# Patient Record
Sex: MALE | Race: BLACK OR AFRICAN AMERICAN | Employment: FULL TIME | URBAN - METROPOLITAN AREA
[De-identification: names, ages, dates, MRNs, and addresses within clinical notes are randomized per-mention and may not be internally consistent; named-entity substitution may affect disease eponyms.]

---

## 2017-05-05 ENCOUNTER — APPOINTMENT (OUTPATIENT)
Dept: CT IMAGING | Age: 22
End: 2017-05-05
Attending: PHYSICIAN ASSISTANT
Payer: COMMERCIAL

## 2017-05-05 ENCOUNTER — HOSPITAL ENCOUNTER (EMERGENCY)
Age: 22
Discharge: HOME | End: 2017-05-05
Attending: EMERGENCY MEDICINE
Payer: COMMERCIAL

## 2017-05-05 VITALS
HEART RATE: 61 BPM | OXYGEN SATURATION: 99 % | WEIGHT: 282.19 LBS | DIASTOLIC BLOOD PRESSURE: 66 MMHG | RESPIRATION RATE: 18 BRPM | HEIGHT: 74 IN | TEMPERATURE: 98 F | BODY MASS INDEX: 36.22 KG/M2 | SYSTOLIC BLOOD PRESSURE: 135 MMHG

## 2017-05-05 DIAGNOSIS — H57.11 EYE PAIN, RIGHT: Primary | ICD-10-CM

## 2017-05-05 PROCEDURE — 99283 EMERGENCY DEPT VISIT LOW MDM: CPT

## 2017-05-05 PROCEDURE — 74011250637 HC RX REV CODE- 250/637: Performed by: PHYSICIAN ASSISTANT

## 2017-05-05 PROCEDURE — 70450 CT HEAD/BRAIN W/O DYE: CPT

## 2017-05-05 PROCEDURE — 74011000250 HC RX REV CODE- 250: Performed by: PHYSICIAN ASSISTANT

## 2017-05-05 RX ORDER — TETRACAINE HYDROCHLORIDE 5 MG/ML
1 SOLUTION OPHTHALMIC
Status: COMPLETED | OUTPATIENT
Start: 2017-05-05 | End: 2017-05-05

## 2017-05-05 RX ORDER — HYDROCODONE BITARTRATE AND ACETAMINOPHEN 5; 325 MG/1; MG/1
1 TABLET ORAL
Qty: 20 TAB | Refills: 0 | Status: SHIPPED | OUTPATIENT
Start: 2017-05-05 | End: 2017-06-29 | Stop reason: CLARIF

## 2017-05-05 RX ORDER — IBUPROFEN 600 MG/1
600 TABLET ORAL
Qty: 20 TAB | Refills: 0 | Status: SHIPPED | OUTPATIENT
Start: 2017-05-05 | End: 2018-01-29

## 2017-05-05 RX ORDER — IBUPROFEN 600 MG/1
600 TABLET ORAL
Status: COMPLETED | OUTPATIENT
Start: 2017-05-05 | End: 2017-05-05

## 2017-05-05 RX ADMIN — FLUORESCEIN SODIUM 1 STRIP: 1 STRIP OPHTHALMIC at 09:40

## 2017-05-05 RX ADMIN — TETRACAINE HYDROCHLORIDE 1 DROP: 5 SOLUTION OPHTHALMIC at 09:40

## 2017-05-05 RX ADMIN — IBUPROFEN 600 MG: 600 TABLET, FILM COATED ORAL at 09:39

## 2017-05-05 NOTE — LETTER
[address] 
[facility] EMERGENCY DEPT 
[address] [place] [address] [phone] 
[phone] Work/School Note Date: 5/5/2017 To Whom It May concern: 
 
Zach Noe was seen and treated today in the emergency room by the following provider(s): 
Attending Provider: Angus Mancilla MD 
Physician Assistant: PRESLEY Aguilar. Zach Noe may return to physical or strenuous activity once cleared by a licensed physician. Sincerely, Sachin Valdez, [address]

## 2017-05-05 NOTE — ED PROVIDER NOTES
HPI Comments: Lizeth Black is a 19 y.o. male who presents ambulatory to the ED with cc of gradually worsening right eye pain x a \"few years. \" Pt reports associated HA and blurred vision in the morning. He notes that it feel as if his eye is going to \"pop\". Pt states that he notices his eye bulging and sinking. He reports that his pain is unchanged with position changes. Pt denies use of medication to modify his symptoms. He reports a hx of similar symptoms in the past and states that he has his eyes evaluated by ophthalmology and also had an MRI through Neurology with no significant findings. Pt denies any known trauma, cough, rhinorrhea, sore throat, or eye discharge. Social history significant for: - Tobacco, + EtOH, - Illicit drug use    PCP: Koko Smith MD    There are no other complaints, changes or physical findings at this time. Written by NATHAN Spenceibpro, as dictated by Giles Osgood, PA-C      The history is provided by the patient. No  was used. History reviewed. No pertinent past medical history. History reviewed. No pertinent surgical history. History reviewed. No pertinent family history. Social History     Social History    Marital status: SINGLE     Spouse name: N/A    Number of children: N/A    Years of education: N/A     Occupational History    Not on file. Social History Main Topics    Smoking status: Never Smoker    Smokeless tobacco: Not on file    Alcohol use Yes      Comment: occasional    Drug use: No    Sexual activity: Not on file     Other Topics Concern    Not on file     Social History Narrative         ALLERGIES: Tramadol    Review of Systems   Constitutional: Negative. Negative for chills and fever. HENT: Negative for rhinorrhea and sore throat. Eyes: Positive for pain (Right) and visual disturbance. Respiratory: Negative. Negative for cough, chest tightness, shortness of breath and wheezing. Cardiovascular: Negative. Negative for chest pain and palpitations. Gastrointestinal: Negative. Negative for abdominal pain, constipation, diarrhea, nausea and vomiting. Genitourinary: Negative. Negative for dysuria and hematuria. Musculoskeletal: Negative. Negative for arthralgias and myalgias. Skin: Negative. Negative for rash. Allergic/Immunologic: Negative. Negative for environmental allergies and food allergies. Neurological: Positive for headaches. Psychiatric/Behavioral: Negative. Negative for suicidal ideas. Patient Vitals for the past 12 hrs:   Temp Pulse Resp BP SpO2   05/05/17 0930 98 °F (36.7 °C) 61 18 135/66 99 %     Physical Exam   Constitutional: He is oriented to person, place, and time. He appears well-developed and well-nourished. No distress. Pt appears well, awake and alert in NAD. HENT:   Head: Normocephalic and atraumatic. Right Ear: Tympanic membrane, external ear and ear canal normal.   Left Ear: Tympanic membrane, external ear and ear canal normal.   Nose: Nose normal.   Mouth/Throat: Uvula is midline, oropharynx is clear and moist and mucous membranes are normal.   Eyes: Conjunctivae and EOM are normal. Pupils are equal, round, and reactive to light. Right eye exhibits no discharge. Left eye exhibits no discharge. Right conjunctiva is not injected. Right conjunctiva has no hemorrhage. Left conjunctiva is not injected. Left conjunctiva has no hemorrhage. IOP: 9 per Schioetz tonometer   Neck: Normal range of motion. Cardiovascular: Normal rate, normal heart sounds and intact distal pulses. Pulmonary/Chest: Effort normal and breath sounds normal. No respiratory distress. He has no wheezes. He has no rales. He exhibits no tenderness. Abdominal: Soft. Bowel sounds are normal. There is no tenderness. There is no guarding. No CVA tenderness b/l. Musculoskeletal: Normal range of motion. He exhibits no edema or tenderness.    Lymphadenopathy:     He has no cervical adenopathy. Neurological: He is alert and oriented to person, place, and time. No cranial nerve deficit. Coordination normal.   No focal neuro deficits. Skin: Skin is warm and dry. No rash noted. He is not diaphoretic. No erythema. No pallor. Psychiatric: He has a normal mood and affect. His behavior is normal.   Nursing note and vitals reviewed. MDM  Number of Diagnoses or Management Options  Eye pain, right:   Diagnosis management comments: DDx: Mass, Atypical Ha, Migraine HA, Corneal abrasion  Low suspicion of glaucoma as patient notes symptoms for over a year with prior ophthalmology evaluation. VS wnl, IOP wnl. Amount and/or Complexity of Data Reviewed  Tests in the radiology section of CPT®: ordered and reviewed  Review and summarize past medical records: yes  Discuss the patient with other providers: yes (Attending)    Patient Progress  Patient progress: stable    Procedures    Procedure Note - Wood's lamp exam:  9:39 AM  Performed by: Good Dumas PA-C  Pts Right eye was anesthetized with tetracaine, stained with fluorescein, and examined with a Wood's lamp, using lid eversion. Foreign body: no  Fluorescein uptake: no  The procedure took 1-15 minutes, and pt tolerated well. Written by Soledad Navarro ED Scribe, as dictated by Good Dumas PA-C. PROGRESS NOTE:  10:41 AM  Discussed pt case with Aurelia Cedillo MD. She recommends MRI of the pt's orbits. Written by Soledad Navarro ED scribe, as dictated by Good Dumas PA-C    PROGRESS NOTE:  10:45 AM  Pt denies MRI imaging today in the ED. Pt states that he has to pick someone up at 1200. He also states that he has  drill tomorrow. I advise pt to not participate in any strenuous or contact activity until his symptoms have been fully evaluated. Advised to contact PCP or neurologist to have MRI completed as soon as possible. Patient conveys understanding and agreement to all of the above.    Written by Soledad Navarro ED armond, as dictated by Freedom Barksdale PA-C    IMAGING RESULTS:  CT HEAD WO CONT   Final Result   INDICATION: R eye pain radiating into r side of scalp x 1 week      Exam: Noncontrast CT of the brain is performed with 5 mm collimation.     CT dose reduction was achieved with the use of the standardized protocol  tailored for this examination and automatic exposure control for dose  modulation.     Direct comparison is made to prior CT dated June 2009.     FINDINGS: There is no acute intracranial hemorrhage, mass, mass effect or  herniation. Ventricular system is normal. The gray-white matter differentiation  is well-preserved. The mastoid air cells are well pneumatized. The visualized  paranasal sinuses are normal. Within the inferior right orbit, there is linear  soft tissue with associated central calcification; while the central  calcification is unchanged, the soft tissue component has likely increased.     IMPRESSION  IMPRESSION: Inferior right orbit linear soft tissue which appears to have  increased compared to prior CT dated June 2009. Recommend clinical correlation  as well as dedicated MRI of the orbits for further evaluation. MEDICATIONS GIVEN:  Medications   ibuprofen (MOTRIN) tablet 600 mg (600 mg Oral Given 5/5/17 7802)   tetracaine HCl (PF) (PONTOCAINE) 0.5 % ophthalmic solution 1 Drop (1 Drop Right Eye Given by Provider 5/5/17 3561)   fluorescein (FUL-BEE) 1 mg ophthalmic strip 1 Strip (1 Strip Both Eyes Given by Provider 5/5/17 6189)       IMPRESSION:  1. Eye pain, right        PLAN:  1. Current Discharge Medication List      START taking these medications    Details   HYDROcodone-acetaminophen (NORCO) 5-325 mg per tablet Take 1 Tab by mouth every six (6) hours as needed for Pain. Max Daily Amount: 4 Tabs. Qty: 20 Tab, Refills: 0      ibuprofen (MOTRIN) 600 mg tablet Take 1 Tab by mouth every six (6) hours as needed for Pain. Qty: 20 Tab, Refills: 0           2.    Follow-up Information Follow up With Details Comments 0499 Tony Humphries MD Schedule an appointment as soon as possible for a visit in 3 days  [address] [facility] [address]      Elyse Paniagua MD Schedule an appointment as soon as possible for a visit in 4 days  [address]   MOB [address]  [place]  [phone]      [facility] EMERGENCY DEPT  As needed or, If symptoms worsen [address]  [address]  [phone]        Return to ED if worse     DISCHARGE NOTE  10:46 AM  The patient has been re-evaluated and is ready for discharge. Reviewed available results with patient. Counseled patient on diagnosis and care plan. Patient has expressed understanding, and all questions have been answered. Patient agrees with plan and agrees to follow up as recommended, or return to the ED if their symptoms worsen. Discharge instructions have been provided and explained to the patient, along with reasons to return to the ED. This note is prepared by Shane Soto, acting as Scribe for Faheem Carrillo PA-C. Faheem Carrillo PA-C: The scribe's documentation has been prepared under my direction and personally reviewed by me in its entirety. I confirm that the note above accurately reflects all work, treatment, procedures, and medical decision making performed by me. This note will not be viewable in [address]

## 2017-06-29 ENCOUNTER — TELEPHONE (OUTPATIENT)
Dept: NEUROLOGY | Age: 22
End: 2017-06-29

## 2017-06-29 ENCOUNTER — OFFICE VISIT (OUTPATIENT)
Dept: NEUROLOGY | Age: 22
End: 2017-06-29

## 2017-06-29 VITALS
RESPIRATION RATE: 12 BRPM | DIASTOLIC BLOOD PRESSURE: 62 MMHG | HEIGHT: 74 IN | WEIGHT: 276 LBS | SYSTOLIC BLOOD PRESSURE: 110 MMHG | BODY MASS INDEX: 35.42 KG/M2 | OXYGEN SATURATION: 98 % | HEART RATE: 60 BPM

## 2017-06-29 DIAGNOSIS — H05.89 ORBITAL MASS: ICD-10-CM

## 2017-06-29 DIAGNOSIS — G44.019 EPISODIC CLUSTER HEADACHE, NOT INTRACTABLE: Primary | ICD-10-CM

## 2017-06-29 DIAGNOSIS — R51.9: ICD-10-CM

## 2017-06-29 DIAGNOSIS — H53.9 TRANSIENT VISION DISTURBANCE OF RIGHT EYE: ICD-10-CM

## 2017-06-29 RX ORDER — BUTALBITAL, ACETAMINOPHEN AND CAFFEINE 50; 325; 40 MG/1; MG/1; MG/1
2 TABLET ORAL
Qty: 50 TAB | Refills: 11 | Status: SHIPPED | OUTPATIENT
Start: 2017-06-29 | End: 2018-01-29

## 2017-06-29 NOTE — PATIENT INSTRUCTIONS

## 2017-06-29 NOTE — PROGRESS NOTES
Consult  REFERRED BY:  Rupert Aldana MD    CHIEF COMPLAINT: Right eye pain    Subjective:     Ritika Stephens is a 23 y.o. right-handed -American male seen as a new patient today for evaluation of a new problem of progressive right eye pain and blurred vision on referral from Dr. Tamara Jones. Patient gives a 10 year history of slowly progressive pain in the right periorbital region that is described as severe, slightly worse at nighttime when he wakes up in the morning, but can occur any time. Associated with occasional blurred vision at times, but no loss of vision no double vision, no fever, and bending over seems to make the pain worse. His eye will sometimes become proptotic and bulging outward physically when he bends over or has severe pain. He just takes over-the-counter anti-inflammatories for pain because he is a  and cannot take stronger medication. He had a CT scan of the head done in 2009 and was read out as normal, and had a recent head CT scan that does show a right sided retrobulbar inferior and posterior orbital mass. Patient denies any fever, meningismus, head trauma in the past other than one fall when he was younger and sustained a scalp laceration that was stitched up but had no other neurologic problem afterwards. He did have one episode of syncope after he stood up quickly after tying his shoes have been bending over for long period of time when he was <13years of age. He denies any other focal weakness, sensory loss, or left eye pain. CT of the head ×2 in 2009 and 2017 have been normal otherwise. He has no other associated medical condition, no connective tissue disease, no unusual lymphadenopathy, no malaise, no meningismus, no fever. He has no family history of similar problems.    The pain has become more severe and sometimes he can even go to work because the pain is so intense, and it is progressively getting worse and more frequent and never seems to go away now.    Past Medical History:   Diagnosis Date    Migraine       History reviewed. No pertinent surgical history. Family History   Problem Relation Age of Onset    Migraines Mother     Migraines Maternal Aunt     Migraines Maternal Grandmother       Social History   Substance Use Topics    Smoking status: Never Smoker    Smokeless tobacco: Never Used    Alcohol use No      Comment: occasional         Current Outpatient Prescriptions:     butalbital-acetaminophen-caffeine (FIORICET, ESGIC) -40 mg per tablet, Take 2 Tabs by mouth every eight (8) hours as needed for Pain or Headache. Max Daily Amount: 6 Tabs. Indications: MIGRAINE, TENSION-TYPE HEADACHE, Disp: 50 Tab, Rfl: 11    ibuprofen (MOTRIN) 600 mg tablet, Take 1 Tab by mouth every six (6) hours as needed for Pain., Disp: 20 Tab, Rfl: 0        Allergies   Allergen Reactions    Tramadol Itching        Review of Systems:  A comprehensive review of systems was negative except for: Constitutional: positive for fatigue and malaise  Eyes: positive for visual disturbance  Neurological: positive for headaches and Right periorbital pain   Vitals:    06/29/17 0830   BP: 110/62   Pulse: 60   Resp: 12   SpO2: 98%   Weight: 276 lb (125.2 kg)   Height: 6' 2\" (1.88 m)     Objective:     I      NEUROLOGICAL EXAM:    Appearance: The patient is well developed, well nourished, provides a coherent history and is in mild acute distress because of right periorbital headache . Mental Status: Oriented to time, place and person, and the president, cognitive function is normal and speech is fluent and no aphasia or dysarthria. Mood and affect appropriate. Cranial Nerves:   Intact visual fields. Fundi are benign. Visual acuity is 20/20 in the right eye, and 20/25 in the left eye but he had previous sun damage to his left eye. YAJAIRA, EOM's full, no nystagmus, no ptosis. Facial sensation is normal. Corneal reflexes are not tested. Facial movement is symmetric.  Hearing is normal bilaterally. Palate is midline with normal sternocleidomastoid and trapezius muscles are normal. Tongue is midline. Neck without meningismus or bruits   Motor:  5/5 strength in upper and lower proximal and distal muscles. Normal bulk and tone. No fasciculations. Reflexes:   Deep tendon reflexes 2+/4 and symmetrical.  No babinski or clonus present   Sensory:   Normal to touch, pinprick and vibration. DSS is intact   Gait:  Normal gait. Tremor:   No tremor noted. Cerebellar:  No cerebellar signs present. Neurovascular:  Normal heart sounds and regular rhythm, peripheral pulses intact, and no carotid bruits. Assessment:       ICD-10-CM ICD-9-CM    1. Episodic cluster headache, not intractable G44.019 339.01 MRI BRAIN W WO CONT      JAROD COMPREHENSIVE PLUS PANEL      ANGIOTENSIN CONVERTING ENZYME      CBC WITH AUTOMATED DIFF      SED RATE (ESR)      VITAMIN D, 25 HYROXY PANEL      VITAMIN B12 & FOLATE      IMMUNOELECTROPHORESIS (IMMUNOFIX.)      ANTINEURONAL CELL AB      butalbital-acetaminophen-caffeine (FIORICET, ESGIC) -40 mg per tablet      ANCA PANEL      TSH 3RD GENERATION      T4      T3 TOTAL      RPR      RA + CCP ABS   2. Headache, retro-ocular, rightr R51 784.0 MRI BRAIN W WO CONT      JAROD COMPREHENSIVE PLUS PANEL      ANGIOTENSIN CONVERTING ENZYME      CBC WITH AUTOMATED DIFF      SED RATE (ESR)      VITAMIN D, 25 HYROXY PANEL      VITAMIN B12 & FOLATE      IMMUNOELECTROPHORESIS (IMMUNOFIX.)      ANTINEURONAL CELL AB      butalbital-acetaminophen-caffeine (FIORICET, ESGIC) -40 mg per tablet      ANCA PANEL      TSH 3RD GENERATION      T4      T3 TOTAL      RPR      RA + CCP ABS   3.  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 Transient vision disturbance of right eye H53.9 368.9 MRI BRAIN W WO CONT      JAROD COMPREHENSIVE PLUS PANEL      ANGIOTENSIN CONVERTING ENZYME      CBC WITH AUTOMATED DIFF      SED RATE (ESR)      VITAMIN D, 25 HYROXY PANEL      VITAMIN B12 & FOLATE      IMMUNOELECTROPHORESIS (IMMUNOFIX.)      ANTINEURONAL CELL AB      butalbital-acetaminophen-caffeine (FIORICET, ESGIC) -40 mg per tablet      ANCA PANEL      TSH 3RD GENERATION      T4      T3 TOTAL      RPR      RA + CCP ABS         Plan:     Patient with a right orbital mass posteriorly and inferiorly, possibilities could include pseudotumor cerebri, lymphoma, tumor, or other inflammatory or neoplastic mass lesion  We will get an MRI of the brain and orbits with and without contrast as soon as possible  He will get a CBC and differential and sed rate, JAROD,ANCA panel, rheumatoid titer, serology, ACE level, thyroid test, and will be referred to neuro-ophthalmology and they have been called and we are waiting their call back. He may need biopsy or empiric treatment with steroids. Patient otherwise has a completely normal exam  Further treatment evaluation depending the results of his clinical course, we gave him Fioricet to use as needed for the headache in the interim but not when he is working. He will call for results, and his CT scan was reviewed personally on the PACS system today, that is both of them, and I agree with the interpretation that there was minimal tumor present 2009 and is clearly grown since that time and needs to be evaluated. I reviewed all his records and lab studies on the chart too. One hour spent with the patient going over his films, Ng diagnosis, going over his prognosis, and further therapy options.     Signed By: Balta Howell MD     June 29, 2017       CC: France Dickinson MD  FAX: [phone]    This note will not be viewable in [address]

## 2017-06-29 NOTE — LETTER
6/29/2017 2:26 PM 
 
RE:    Lorrane Hidden Coppin  
[address] [facility] [address] [id number] Thank you for agreeing to see Jeanne August I am referring my patient to you. Please see his 
pertinent patient information below. Progress Notes Encounter Date: 6/29/2017 Jamila Lang MD  
Neurology Hide copied text Hover for attribution information Consult REFERRED BY: 
Renetta Albarado MD 
  
CHIEF COMPLAINT: Right eye pain 
  
Subjective:  
  
Scarlet Phelps is a 23 y.o. right-handed -American male seen as a new patient today for evaluation of a new problem of progressive right eye pain and blurred vision on referral from Dr. Merline Bernstein. Patient gives a 10 year history of slowly progressive pain in the right periorbital region that is described as severe, slightly worse at nighttime when he wakes up in the morning, but can occur any time. Associated with occasional blurred vision at times, but no loss of vision no double vision, no fever, and bending over seems to make the pain worse. His eye will sometimes become proptotic and bulging outward physically when he bends over or has severe pain. He just takes over-the-counter anti-inflammatories for pain because he is a  and cannot take stronger medication. He had a CT scan of the head done in 2009 and was read out as normal, and had a recent head CT scan that does show a right sided retrobulbar inferior and posterior orbital mass. Patient denies any fever, meningismus, head trauma in the past other than one fall when he was younger and sustained a scalp laceration that was stitched up but had no other neurologic problem afterwards. He did have one episode of syncope after he stood up quickly after tying his shoes have been bending over for long period of time when he was <13years of age. He denies any other focal weakness, sensory loss, or left eye pain.   CT of the head ×2 in 2009 and 2017 have been normal otherwise. He has no other associated medical condition, no connective tissue disease, no unusual lymphadenopathy, no malaise, no meningismus, no fever. He has no family history of similar problems. The pain has become more severe and sometimes he can even go to work because the pain is so intense, and it is progressively getting worse and more frequent and never seems to go away now. 
  
    
Past Medical History:  
Diagnosis Date  Migraine    
  
History reviewed. No pertinent surgical history. Family History Problem Relation Age of Onset  Migraines Mother    
 Migraines Maternal Aunt    
 Migraines Maternal Grandmother    
  
      
Social History Substance Use Topics  Smoking status: Never Smoker  Smokeless tobacco: Never Used  Alcohol use No  
      Comment: occasional  
   
  
Current Outpatient Prescriptions:  
  butalbital-acetaminophen-caffeine (FIORICET, ESGIC) -40 mg per tablet, Take 2 Tabs by mouth every eight (8) hours as needed for Pain or Headache. Max Daily Amount: 6 Tabs. Indications: MIGRAINE, TENSION-TYPE HEADACHE, Disp: 50 Tab, Rfl: 11 
  ibuprofen (MOTRIN) 600 mg tablet, Take 1 Tab by mouth every six (6) hours as needed for Pain., Disp: 20 Tab, Rfl: 0 
  
  
  
    
Allergies Allergen Reactions  Tramadol Itching  
  
  
Review of Systems: A comprehensive review of systems was negative except for: Constitutional: positive for fatigue and malaise Eyes: positive for visual disturbance Neurological: positive for headaches and Right periorbital pain Vitals:  
  06/29/17 0830 BP: 110/62 Pulse: 60 Resp: 12 SpO2: 98% Weight: 276 lb (125.2 kg) Height: 6' 2\" (1.88 m)  
  
Objective:  
  
I 
  
  
NEUROLOGICAL EXAM: 
  
Appearance: The patient is well developed, well nourished, provides a coherent history and is in mild acute distress because of right periorbital headache . Mental Status: Oriented to time, place and person, and the president, cognitive function is normal and speech is fluent and no aphasia or dysarthria. Mood and affect appropriate. Cranial Nerves:   Intact visual fields. Fundi are benign. Visual acuity is 20/20 in the right eye, and 20/25 in the left eye but he had previous sun damage to his left eye. YAJAIRA, EOM's full, no nystagmus, no ptosis. Facial sensation is normal. Corneal reflexes are not tested. Facial movement is symmetric. Hearing is normal bilaterally. Palate is midline with normal sternocleidomastoid and trapezius muscles are normal. Tongue is midline. Neck without meningismus or bruits Motor:  5/5 strength in upper and lower proximal and distal muscles. Normal bulk and tone. No fasciculations. Reflexes:   Deep tendon reflexes 2+/4 and symmetrical. 
No babinski or clonus present Sensory:   Normal to touch, pinprick and vibration. DSS is intact Gait:  Normal gait. Tremor:   No tremor noted. Cerebellar:  No cerebellar signs present. Neurovascular:  Normal heart sounds and regular rhythm, peripheral pulses intact, and no carotid bruits.  
  
  
  
  
Assessment:  
  
    ICD-10-CM ICD-9-CM    
1. Episodic cluster headache, not intractable G44.019 339.01 MRI BRAIN W WO CONT  
      JAROD COMPREHENSIVE PLUS PANEL  
      ANGIOTENSIN CONVERTING ENZYME  
      CBC WITH AUTOMATED DIFF  
      SED RATE (ESR)  
      VITAMIN D, 25 HYROXY PANEL  
      VITAMIN B12 & FOLATE  
      IMMUNOELECTROPHORESIS (IMMUNOFIX.)       ANTINEURONAL CELL AB  
      butalbital-acetaminophen-caffeine (FIORICET, ESGIC) -40 mg per tablet  
      ANCA PANEL  
      TSH 3RD GENERATION  
      T4  
      T3 TOTAL  
      RPR  
      RA + CCP ABS 2. Headache, retro-ocular, rightr R51 784.0 MRI BRAIN W WO CONT  
      JAROD COMPREHENSIVE PLUS PANEL  
      ANGIOTENSIN CONVERTING ENZYME  
      CBC WITH AUTOMATED DIFF  
      SED RATE (ESR)       VITAMIN D, 25 HYROXY PANEL  
      VITAMIN B12 & FOLATE  
      IMMUNOELECTROPHORESIS (IMMUNOFIX.)       ANTINEURONAL CELL AB  
      butalbital-acetaminophen-caffeine (FIORICET, ESGIC) -40 mg per tablet  
      ANCA PANEL  
      TSH 3RD GENERATION  
      T4  
      T3 TOTAL  
      RPR  
      RA + CCP ABS 3. Orbital mass, right H05.89 376.89 MRI BRAIN W WO CONT  
      JAROD COMPREHENSIVE PLUS PANEL  
      ANGIOTENSIN CONVERTING ENZYME  
      CBC WITH AUTOMATED DIFF  
      SED RATE (ESR)  
      VITAMIN D, 25 HYROXY PANEL  
      VITAMIN B12 & FOLATE  
      IMMUNOELECTROPHORESIS (IMMUNOFIX.)       ANTINEURONAL CELL AB  
      butalbital-acetaminophen-caffeine (FIORICET, ESGIC) -40 mg per tablet  
      ANCA PANEL  
      TSH 3RD GENERATION  
      T4  
      T3 TOTAL  
      RPR  
      RA + CCP ABS 4. Transient vision disturbance of right eye H53.9 368.9 MRI BRAIN W WO CONT  
      JAROD COMPREHENSIVE PLUS PANEL  
      ANGIOTENSIN CONVERTING ENZYME  
      CBC WITH AUTOMATED DIFF  
      SED RATE (ESR)  
      VITAMIN D, 25 HYROXY PANEL  
      VITAMIN B12 & FOLATE  
      IMMUNOELECTROPHORESIS (IMMUNOFIX.)       ANTINEURONAL CELL AB  
      butalbital-acetaminophen-caffeine (FIORICET, ESGIC) -40 mg per tablet  
      ANCA PANEL  
      TSH 3RD GENERATION  
      T4  
      T3 TOTAL  
      RPR  
      RA + CCP ABS  
  
  
  
Plan:  
  
Patient with a right orbital mass posteriorly and inferiorly, possibilities could include pseudotumor cerebri, lymphoma, tumor, or other inflammatory or neoplastic mass lesion We will get an MRI of the brain and orbits with and without contrast as soon as possible He will get a CBC and differential and sed rate, JAROD,ANCA panel, rheumatoid titer, serology, ACE level, thyroid test, and will be referred to neuro-ophthalmology and they have been called and we are waiting their call back. He may need biopsy or empiric treatment with steroids. Patient otherwise has a completely normal exam 
Further treatment evaluation depending the results of his clinical course, we gave him Fioricet to use as needed for the headache in the interim but not when he is working. He will call for results, and his CT scan was reviewed personally on the PACS system today, that is both of them, and I agree with the interpretation that there was minimal tumor present 2009 and is clearly grown since that time and needs to be evaluated. I reviewed all his records and lab studies on the chart too. One hour spent with the patient going over his films, Ng diagnosis, going over his prognosis, and further therapy options. 
  
Signed By: Yoandy Garcia MD   
  June 29, 2017   
    
  
 
 
 
 
I appreciate your assistance in Mr. Isaac's care  and look forward to your findings and recommendations. Sincerely, Yoandy Garcia MD

## 2017-06-29 NOTE — LETTER
6/29/2017 10:05 AM 
 
Patient:  Kirsten Troncoso YOB: 1995 Date of Visit: 6/29/2017 Dear No Recipients: Thank you for referring Mr. Kiana Freedman to me for evaluation/treatment. Below are the relevant portions of my assessment and plan of care. Consult REFERRED BY: 
Patrick Rangel MD 
 
CHIEF COMPLAINT: Right eye pain Subjective:  
 
Kirsten Troncoso is a 23 y.o. right-handed -American male seen as a new patient today for evaluation of a new problem of progressive right eye pain and blurred vision on referral from Dr. Misael Lynn. Patient gives a 10 year history of slowly progressive pain in the right periorbital region that is described as severe, slightly worse at nighttime when he wakes up in the morning, but can occur any time. Associated with occasional blurred vision at times, but no loss of vision no double vision, no fever, and bending over seems to make the pain worse. His eye will sometimes become proptotic and bulging outward physically when he bends over or has severe pain. He just takes over-the-counter anti-inflammatories for pain because he is a  and cannot take stronger medication. He had a CT scan of the head done in 2009 and was read out as normal, and had a recent head CT scan that does show a right sided retrobulbar inferior and posterior orbital mass. Patient denies any fever, meningismus, head trauma in the past other than one fall when he was younger and sustained a scalp laceration that was stitched up but had no other neurologic problem afterwards. He did have one episode of syncope after he stood up quickly after tying his shoes have been bending over for long period of time when he was <13years of age. He denies any other focal weakness, sensory loss, or left eye pain. CT of the head ×2 in 2009 and 2017 have been normal otherwise.   He has no other associated medical condition, no connective tissue disease, no unusual lymphadenopathy, no malaise, no meningismus, no fever. He has no family history of similar problems. The pain has become more severe and sometimes he can even go to work because the pain is so intense, and it is progressively getting worse and more frequent and never seems to go away now. Past Medical History:  
Diagnosis Date  Migraine History reviewed. No pertinent surgical history. Family History Problem Relation Age of Onset  Migraines Mother  Migraines Maternal Aunt  Migraines Maternal Grandmother Social History Substance Use Topics  Smoking status: Never Smoker  Smokeless tobacco: Never Used  Alcohol use No  
   Comment: occasional  
   
 
Current Outpatient Prescriptions:  
  butalbital-acetaminophen-caffeine (FIORICET, ESGIC) -40 mg per tablet, Take 2 Tabs by mouth every eight (8) hours as needed for Pain or Headache. Max Daily Amount: 6 Tabs. Indications: MIGRAINE, TENSION-TYPE HEADACHE, Disp: 50 Tab, Rfl: 11 
  ibuprofen (MOTRIN) 600 mg tablet, Take 1 Tab by mouth every six (6) hours as needed for Pain., Disp: 20 Tab, Rfl: 0 Allergies Allergen Reactions  Tramadol Itching Review of Systems: A comprehensive review of systems was negative except for: Constitutional: positive for fatigue and malaise Eyes: positive for visual disturbance Neurological: positive for headaches and Right periorbital pain Vitals:  
 06/29/17 0830 BP: 110/62 Pulse: 60 Resp: 12 SpO2: 98% Weight: 276 lb (125.2 kg) Height: 6' 2\" (1.88 m) Objective: I 
 
 
NEUROLOGICAL EXAM: 
 
Appearance: The patient is well developed, well nourished, provides a coherent history and is in mild acute distress because of right periorbital headache . Mental Status: Oriented to time, place and person, and the president, cognitive function is normal and speech is fluent and no aphasia or dysarthria. Mood and affect appropriate. Cranial Nerves:   Intact visual fields. Fundi are benign. Visual acuity is 20/20 in the right eye, and 20/25 in the left eye but he had previous sun damage to his left eye. YAJAIRA, EOM's full, no nystagmus, no ptosis. Facial sensation is normal. Corneal reflexes are not tested. Facial movement is symmetric. Hearing is normal bilaterally. Palate is midline with normal sternocleidomastoid and trapezius muscles are normal. Tongue is midline. Neck without meningismus or bruits Motor:  5/5 strength in upper and lower proximal and distal muscles. Normal bulk and tone. No fasciculations. Reflexes:   Deep tendon reflexes 2+/4 and symmetrical. 
No babinski or clonus present Sensory:   Normal to touch, pinprick and vibration. DSS is intact Gait:  Normal gait. Tremor:   No tremor noted. Cerebellar:  No cerebellar signs present. Neurovascular:  Normal heart sounds and regular rhythm, peripheral pulses intact, and no carotid bruits. Assessment: ICD-10-CM ICD-9-CM 1. Episodic cluster headache, not intractable G44.019 339.01 MRI BRAIN W WO CONT  
   JAROD COMPREHENSIVE PLUS PANEL  
   ANGIOTENSIN CONVERTING ENZYME  
   CBC WITH AUTOMATED DIFF  
   SED RATE (ESR) VITAMIN D, 25 HYROXY PANEL  
   VITAMIN B12 & FOLATE IMMUNOELECTROPHORESIS (IMMUNOFIX.) ANTINEURONAL CELL AB  
   butalbital-acetaminophen-caffeine (FIORICET, ESGIC) -40 mg per tablet ANCA PANEL  
   TSH 3RD GENERATION  
   T4  
   T3 TOTAL  
   RPR  
   RA + CCP ABS 2. Headache, retro-ocular, rightr R51 784.0 MRI BRAIN W WO CONT  
   JAROD COMPREHENSIVE PLUS PANEL  
   ANGIOTENSIN CONVERTING ENZYME  
   CBC WITH AUTOMATED DIFF  
   SED RATE (ESR) VITAMIN D, 25 HYROXY PANEL  
   VITAMIN B12 & FOLATE IMMUNOELECTROPHORESIS (IMMUNOFIX.) ANTINEURONAL CELL AB  
   butalbital-acetaminophen-caffeine (FIORICET, ESGIC) -40 mg per tablet    ANCA PANEL  
 TSH 3RD GENERATION  
   T4  
   T3 TOTAL  
   RPR  
   RA + CCP ABS 3. Orbital mass, right H05.89 376.89 MRI BRAIN W WO CONT  
   JAROD COMPREHENSIVE PLUS PANEL  
   ANGIOTENSIN CONVERTING ENZYME  
   CBC WITH AUTOMATED DIFF  
   SED RATE (ESR) VITAMIN D, 25 HYROXY PANEL  
   VITAMIN B12 & FOLATE IMMUNOELECTROPHORESIS (IMMUNOFIX.) ANTINEURONAL CELL AB  
   butalbital-acetaminophen-caffeine (FIORICET, ESGIC) -40 mg per tablet ANCA PANEL  
   TSH 3RD GENERATION  
   T4  
   T3 TOTAL  
   RPR  
   RA + CCP ABS 4. Transient vision disturbance of right eye H53.9 368.9 MRI BRAIN W WO CONT  
   JAROD COMPREHENSIVE PLUS PANEL  
   ANGIOTENSIN CONVERTING ENZYME  
   CBC WITH AUTOMATED DIFF  
   SED RATE (ESR) VITAMIN D, 25 HYROXY PANEL  
   VITAMIN B12 & FOLATE IMMUNOELECTROPHORESIS (IMMUNOFIX.) ANTINEURONAL CELL AB  
   butalbital-acetaminophen-caffeine (FIORICET, ESGIC) -40 mg per tablet ANCA PANEL  
   TSH 3RD GENERATION  
   T4  
   T3 TOTAL  
   RPR  
   RA + CCP ABS Plan:  
 
Patient with a right orbital mass posteriorly and inferiorly, possibilities could include pseudotumor cerebri, lymphoma, tumor, or other inflammatory or neoplastic mass lesion We will get an MRI of the brain and orbits with and without contrast as soon as possible He will get a CBC and differential and sed rate, JAROD,ANCA panel, rheumatoid titer, serology, ACE level, thyroid test, and will be referred to neuro-ophthalmology and they have been called and we are waiting their call back. He may need biopsy or empiric treatment with steroids. Patient otherwise has a completely normal exam 
Further treatment evaluation depending the results of his clinical course, we gave him Fioricet to use as needed for the headache in the interim but not when he is working.  
He will call for results, and his CT scan was reviewed personally on the PACS system today, that is both of them, and I agree with the interpretation that there was minimal tumor present 2009 and is clearly grown since that time and needs to be evaluated. I reviewed all his records and lab studies on the chart too. One hour spent with the patient going over his films, Ng diagnosis, going over his prognosis, and further therapy options. Signed By: Estrella Macias MD   
 June 29, 2017 CC: Saurabh Villegas MD 
FAX: [phone] This note will not be viewable in [address] If you have questions, please do not hesitate to call me. I look forward to following Mr. Isaac along with you. Sincerely, Estrella Macias MD

## 2017-06-29 NOTE — MR AVS SNAPSHOT
Visit Information Date & Time Provider Department Dept. Phone Encounter #  
 6/29/2017  8:00 AM Brandy Burden MD [facility]rology [facility] at [facility] [phone] [id number] Follow-up Instructions Return in about 3 months (around 9/29/2017). Upcoming Health Maintenance Date Due INFLUENZA AGE 9 TO ADULT 8/1/2017 DTaP/Tdap/Td series (3 - Td) 11/24/2026 Allergies as of 6/29/2017  Review Complete On: 6/29/2017 By: Brandy Burden MD  
  
 Severity Noted Reaction Type Reactions Tramadol  12/10/2015    Itching Current Immunizations  Never Reviewed Name Date TDAP Vaccine 8/13/2012  2:22 PM  
 Tdap 11/24/2016  4:46 PM  
  
 Not reviewed this visit You Were Diagnosed With   
  
 Codes Comments Episodic cluster headache, not intractable    -  Primary ICD-10-CM: G44.019 
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  
 110/62 60 12 6' 2\" (1.88 m) 276 lb (125.2 kg) 98% BMI Smoking Status 35.44 kg/m2 Never Smoker Vitals History BMI and BSA Data Body Mass Index Body Surface Area  
 35.44 kg/m 2 2.56 m 2 Preferred Pharmacy Pharmacy Name Phone [facility]  [place] [phone] Your Updated Medication List  
  
   
This list is accurate as of: 6/29/17  9:14 AM.  Always use your most recent med list.  
  
  
  
  
 butalbital-acetaminophen-caffeine -40 mg per tablet Commonly known as:  Nohemi Gunn Take 2 Tabs by mouth every eight (8) hours as needed for Pain or Headache. Max Daily Amount: 6 Tabs. Indications: MIGRAINE, TENSION-TYPE HEADACHE ibuprofen 600 mg tablet Commonly known as:  MOTRIN Take 1 Tab by mouth every six (6) hours as needed for Pain. Prescriptions Printed Refills  
 butalbital-acetaminophen-caffeine (FIORICET, ESGIC) -40 mg per tablet 11 Sig: Take 2 Tabs by mouth every eight (8) hours as needed for Pain or Headache. Max Daily Amount: 6 Tabs. Indications: MIGRAINE, TENSION-TYPE HEADACHE Class: Print Route: Oral  
  
We Performed the Following JAROD COMPREHENSIVE PLUS PANEL [[id number] Custom] ANCA PANEL [id number] CPT(R)] ANGIOTENSIN CONVERTING ENZYME [id number] CPT(R)] ANTINEURONAL CELL AB [id number] CPT(R)] CBC WITH AUTOMATED DIFF [15895 CPT(R)] IMMUNOELECTROPHORESIS [facility].) [id number] CPT(R)]   
 RA + CCP ABS [[id number] Custom] RPR [91323 CPT(R)] SED RATE (ESR) [id number] CPT(R)]   
 T3 TOTAL [27686 CPT(R)] T4 [id number] CPT(R)] TSH 3RD GENERATION [61222 CPT(R)] VITAMIN B12 & FOLATE [20311 CPT(R)] VITAMIN D, 25 HYROXY PANEL [[id number] Custom] Follow-up Instructions Return in about 3 months (around 9/29/2017). To-Do List   
 07/06/2017 Imaging:  MRI BRAIN W WO CONT Patient Instructions A Healthy Lifestyle: Care Instructions Your Care Instructions A healthy lifestyle can help you feel good, stay at a healthy weight, and have plenty of energy for both work and play. A healthy lifestyle is something you can share with your whole family. A healthy lifestyle also can lower your risk for serious health problems, such as high blood pressure, heart disease, and diabetes. You can follow a few steps listed below to improve your health and the health of your family. Follow-up care is a key part of your treatment and safety. Be sure to make and go to all appointments, and call your doctor if you are having problems. Its also a good idea to know your test results and keep a list of the medicines you take. How can you care for yourself at home? · Do not eat too much sugar, fat, or fast foods. You can still have dessert and treats now and then. The goal is moderation. · Start small to improve your eating habits. Pay attention to portion sizes, drink less juice and soda pop, and eat more fruits and vegetables. ¨ Eat a healthy amount of food. A 3-ounce serving of meat, for example, is about the size of a deck of cards. Fill the rest of your plate with vegetables and whole grains. ¨ Limit the amount of soda and sports drinks you have every day. Drink more water when you are thirsty. ¨ Eat at least 5 servings of fruits and vegetables every day. It may seem like a lot, but it is not hard to reach this goal. A serving or helping is 1 piece of fruit, 1 cup of vegetables, or 2 cups of leafy, raw vegetables. Have an apple or some carrot sticks as an afternoon snack instead of a candy bar. Try to have fruits and/or vegetables at every meal. 
· Make exercise part of your daily routine. You may want to start with simple activities, such as walking, bicycling, or slow swimming. Try to be active 30 to 60 minutes every day. You do not need to do all 30 to 60 minutes all at once. For example, you can exercise 3 times a day for 10 or 20 minutes. Moderate exercise is safe for most people, but it is always a good idea to talk to your doctor before starting an exercise program. 
· Keep moving. Marizol Gums the lawn, work in the garden, or pfwaterworks. Take the stairs instead of the elevator at work. · If you smoke, quit. People who smoke have an increased risk for heart attack, stroke, cancer, and other lung illnesses. Quitting is hard, but there are ways to boost your chance of quitting tobacco for good. ¨ Use nicotine gum, patches, or lozenges. ¨ Ask your doctor about stop-smoking programs and medicines. ¨ Keep trying.  
In addition to reducing your risk of diseases in the future, you will notice some benefits soon after you stop using tobacco. If you have shortness of breath or asthma symptoms, they will likely get better within a few weeks after you quit. · Limit how much alcohol you drink. Moderate amounts of alcohol (up to 2 drinks a day for men, 1 drink a day for women) are okay. But drinking too much can lead to liver problems, high blood pressure, and other health problems. Family health If you have a family, there are many things you can do together to improve your health. · Eat meals together as a family as often as possible. · Eat healthy foods. This includes fruits, vegetables, lean meats and dairy, and whole grains. · Include your family in your fitness plan. Most people think of activities such as jogging or tennis as the way to fitness, but there are many ways you and your family can be more active. Anything that makes you breathe hard and gets your heart pumping is exercise. Here are some tips: 
¨ Walk to do errands or to take your child to school or the bus. ¨ Go for a family bike ride after dinner instead of watching TV. Where can you learn more? Go to http://londonMemoir Systemseloisa.info/. Enter T786 in the search box to learn more about \"A Healthy Lifestyle: Care Instructions. \" Current as of: July 26, 2016 Content Version: 11.3 © 3811-5281 [employer]. Care instructions adapted under license by [employer] (which disclaims liability or warranty for this information). If you have questions about a medical condition or this instruction, always ask your healthcare professional. [place] [address] any warranty or liability for your use of this information. Introducing hospitals & HEALTH SERVICES! [facility] introduces [email or web address] patient portal. Now you can access parts of your medical record, email your doctor's office, and request medication refills online.    
 
1. In your internet browser, go to [email or web address] 2. Click on the First Time User? Click Here link in the Sign In box. You will see the New Member Sign Up page. 3. Enter your DramaFever Access Code exactly as it appears below. You will not need to use this code after youve completed the sign-up process. If you do not sign up before the expiration date, you must request a new code. · DramaFever Access Code: [email or web address] Expires: 8/3/2017  9:33 AM 
 
4. Enter the last four digits of your Social Security Number (xxxx) and Date of Birth (mm/dd/yyyy) as indicated and click Submit. You will be taken to the next sign-up page. 5. Create a Before the Callt ID. This will be your DramaFever login ID and cannot be changed, so think of one that is secure and easy to remember. 6. Create a DramaFever password. You can change your password at any time. 7. Enter your Password Reset Question and Answer. This can be used at a later time if you forget your password. 8. Enter your e-mail address. You will receive e-mail notification when new information is available in [address] 9. Click Sign Up. You can now view and download portions of your medical record. 10. Click the Download Summary menu link to download a portable copy of your medical information. If you have questions, please visit the Frequently Asked Questions section of the DramaFever website. Remember, DramaFever is NOT to be used for urgent needs. For medical emergencies, dial 911. Now available from your iPhone and Android! Please provide this summary of care documentation to your next provider. Your primary care clinician is listed as Cindy Enamorado. If you have any questions after today's visit, please call [phone].

## 2018-01-29 ENCOUNTER — HOSPITAL ENCOUNTER (EMERGENCY)
Age: 23
Discharge: HOME | End: 2018-01-29
Attending: EMERGENCY MEDICINE
Payer: SELF-PAY

## 2018-01-29 VITALS
DIASTOLIC BLOOD PRESSURE: 69 MMHG | OXYGEN SATURATION: 100 % | TEMPERATURE: 98.7 F | WEIGHT: 270.95 LBS | HEIGHT: 74 IN | BODY MASS INDEX: 34.77 KG/M2 | SYSTOLIC BLOOD PRESSURE: 134 MMHG | RESPIRATION RATE: 14 BRPM

## 2018-01-29 DIAGNOSIS — F32.1 MODERATE SINGLE CURRENT EPISODE OF MAJOR DEPRESSIVE DISORDER (HCC): Primary | ICD-10-CM

## 2018-01-29 PROCEDURE — 90791 PSYCH DIAGNOSTIC EVALUATION: CPT

## 2018-01-29 PROCEDURE — 99284 EMERGENCY DEPT VISIT MOD MDM: CPT

## 2018-01-29 PROCEDURE — 99283 EMERGENCY DEPT VISIT LOW MDM: CPT

## 2018-01-29 RX ORDER — HYDROXYZINE 50 MG/1
50 TABLET, FILM COATED ORAL
Qty: 20 TAB | Refills: 0 | Status: SHIPPED | OUTPATIENT
Start: 2018-01-29 | End: 2018-02-08

## 2018-01-29 RX ORDER — ACETAMINOPHEN, DIPHENHYDRAMINE HCL, PHENYLEPHRINE HCL 325; 25; 5 MG/1; MG/1; MG/1
10 TABLET ORAL
Qty: 30 TAB | Refills: 0 | Status: SHIPPED | OUTPATIENT
Start: 2018-01-29

## 2018-01-29 NOTE — BSMART NOTE
Comprehensive Assessment Form Part 1      Section I - Disposition    Axis I - PTSD   Axis II - Deferred, Borderline Traits  Axis III -   Past Medical History:   Diagnosis Date    Migraine    Axis IV - financial, relationship, employment  [place] V - 52      The Medical Doctor to Psychiatrist conference was not completed. The Medical Doctor is in agreement with Psychiatrist disposition because of (reason) patient is not seeking admission and does not meet criteria for a TDO and ER provider is in agreement with discharge with follow up at [facility]. The plan is ER provider to write for atarax or similar medication and melatonin. Patient to follow up with [facility] outpatient office or [facility]. The on-call Psychiatrist consulted was Dr. Axel Bass. The admitting Psychiatrist will be Dr. Axel Bass. The admitting Diagnosis is NA. The Payor source is [employer]    Section II - Integrated Summary  Summary:  Patient arrives to ER reporting depression with past suicidal ideation without a plan. He denies homicidal ideation and hallucinations. Patient reports that he has decreased appetite and sleep but denies wanting to be admitted reporting financial issues and needing to work tomorrow. Patient reports struggling with his relationship due to her past infidelity. He reports PTSD from his time in the [employer] and \"a bad training experience\". He denies homicidal ideation but reports thoughts of wanting to hurt people when they make him mad like when he gets cut off driving but he reports it is just a fleeting thought and he does not actually want to hurt anyone. He does reports a violence history but when asked about this he reports he will break things but is not violent with people and it is a coping skill he developed. Patient reports he was seeing a counselor but with financial issues and her trying to ease him into 2 times a week. He reports losing his job and he is now working but it does not pay well.  He reports being at the ER today wanting medications and referrals only. The patienthas demonstrated mental capacity to provide informed consent. The information is given by the patient. The Chief Complaint is depression. The Precipitant Factors are psychosocial.  Previous Hospitalizations: no  The patient has not previously been in restraints. Current Psychiatrist and/or  is none. Lethality Assessment:    The potential for suicide noted by the following: ideation . The potential for homicide is not noted. The patient has not been a perpetrator of sexual or physical abuse. There are not pending charges. The patient is not felt to be at risk for self harm or harm to others. The attending nurse was advised that security has not been notified. Section III - Psychosocial  The patient's overall mood and attitude is calm and cooperative but depressed. Feelings of helplessness and hopelessness are observed by verbal report. Generalized anxiety is not observed. Panic is not observed. Phobias are not observed. Obsessive compulsive tendencies are not observed. Section IV - Mental Status Exam  The patient's appearance shows no evidence of impairment. The patient's behavior shows no evidence of impairment. The patient is oriented to time, place, person and situation. The patient's speech shows no evidence of impairment. The patient's mood is depressed, is sad and displays anhedonia. The range of affect is flat. The patient's thought content demonstrates no evidence of impairment. The thought process shows no evidence of impairment. The patient's perception shows no evidence of impairment. The patient's memory shows no evidence of impairment. The patient's appetite is decreased and shows signs of weight loss. The patient's sleep has evidence of insomnia. The patient's insight shows no evidence of impairment. The patient's judgement shows no evidence of impairment.         Section V - Substance Abuse  The patient is using substances. The patient is using cannabis by inhalation for 1-5 years with last use on this week. The patient has experienced the following withdrawal symptoms: N/A. Section VI - Living Arrangements  The patient has a significant other. The patient lives with a significant other. The patient has no children. The patient does plan to return home upon discharge. The patient does not have legal issues pending. The patient's source of income comes from employment. Latter day and cultural practices have not been voiced at this time. The patient's greatest support comes from \"no one\" and this person will not be involved with the treatment. The patient has been in an event described as horrible or outside the realm of ordinary life experience either currently or in the past.  The patient has not been a victim of sexual/physical abuse. Section VII - Other Areas of Clinical Concern  The highest grade achieved is 12 with the overall quality of school experience being described as not assessed. The patient is currently employed and speaks [place] as a primary language. The patient has no communication impairments affecting communication. The patient's preference for learning can be described as: can read and write adequately.   The patient's hearing is normal.  The patient's vision is normal.      Christiano Mckee [facility]

## 2018-01-29 NOTE — DISCHARGE INSTRUCTIONS
[facility],  [address] Providers    Accepts Insured Patients Only:  [facility]  [address]       [phone]   Near the corner of [facility] and [address] in the near [address] [place] [address]. Accepts most insurance including Medicaid/Medicare. No psychiatry. On the [facility] bus line. [phone]  [address] [facility] ([address]  [address] [place]. [address] [facility] [employer])     [phone]   Accepts most major insurances. Psychiatry available. Some DBT groups. [facility] [address] [facility])    [phone]   Mixture of psychologists and psychiatrists. They do not accept Medicaid or Medicare. [facility]  [address] [place] [address]       [phone]   Mixture of clinical social workers and psychologists. Sliding Scale/Financial Aid/Differing Payment Options  [facility]  [address] [facility]) [phone]   Our own Trinjustin Latch and Evonne Carrasco. Variety of treatment options, including DBT. [address]       [phone]   Provides a variety of group and individual counseling options. Insurance, Medicaid, Medicare and sliding scale      Medicaid/Medicare providers in the [address] area  [address] [place] [address]. [address]       [phone]    [facility]         [address]       [phone]    [place]  [address] [place] [address], [facility]    [phone] ex.  [address] [facility]     [phone]    [facility]      [phone]      Services for patients without Medicaid, [place] or Insurance  The  [address]       [phone]   See handout in separate folder    [address]       Depression and Chronic Disease: Care Instructions  Your Care Instructions    A chronic disease is one that you have for a long time. Some chronic diseases can be controlled, but they usually cannot be cured. Depression is common in people with chronic diseases, but it often goes unnoticed. Many people have concerns about seeking treatment for a mental health problem. You may think it's a sign of weakness, or you don't want people to know about it. It's important to overcome these reasons for not seeking treatment. Treating depression or anxiety is good for your health. Follow-up care is a key part of your treatment and safety. Be sure to make and go to all appointments, and call your doctor if you are having problems. It's also a good idea to know your test results and keep a list of the medicines you take. How can you care for yourself at home? Watch for symptoms of depression  The symptoms of depression are often subtle at first. You may think they are caused by your disease rather than depression. Or you may think it is normal to be depressed when you have a chronic disease. If you are depressed you may:  · Feel sad or hopeless. · Feel guilty or worthless. · Not enjoy the things you used to enjoy. · Feel hopeless, as though life is not worth living. · Have trouble thinking or remembering. · Have low energy, and you may not eat or sleep well. · Pull away from others. · Think often about death or killing yourself. (Keep the numbers for these national suicide hotlines: 7-047-991-TALK [1-154.861.7801] and 2-583-FPKSVDT [1-761.608.4671]. )  Get treatment  By treating your depression, you can feel more hopeful and have more energy. If you feel better, you may take better care of yourself, so your health may improve. · Talk to your doctor if you have any changes in mood during treatment for your disease. · Ask your doctor for help. Counseling, antidepressant medicine, or a combination of the two can help most people with depression.  Often a combination works best. Counseling can also help you cope with having a chronic disease. When should you call for help? Call 911 anytime you think you may need emergency care. For example, call if:  ? · You feel like hurting yourself or someone else. ? · Someone you know has depression and is about to attempt or is attempting suicide. ?Call your doctor now or seek immediate medical care if:  ? · You hear voices. ? · Someone you know has depression and:  ¨ Starts to give away his or her possessions. ¨ Uses illegal drugs or drinks alcohol heavily. ¨ Talks or writes about death, including writing suicide notes or talking about guns, knives, or pills. ¨ Starts to spend a lot of time alone. ¨ Acts very aggressively or suddenly appears calm. ? Watch closely for changes in your health, and be sure to contact your doctor if:  ? · You do not get better as expected. Where can you learn more? Go to http://london-eloisa.info/. Enter F698 in the search box to learn more about \"Depression and Chronic Disease: Care Instructions. \"  Current as of: May 12, 2017  Content Version: 11.4  © 3899-1891 [employer]. Care instructions adapted under license by [employer] (which disclaims liability or warranty for this information). If you have questions about a medical condition or this instruction, always ask your healthcare professional. [address] any warranty or liability for your use of this information.

## 2018-01-29 NOTE — ED PROVIDER NOTES
EMERGENCY DEPARTMENT HISTORY AND PHYSICAL EXAM      Date: 1/29/2018  Patient Name: Kaleigh Peres    History of Presenting Illness     Chief Complaint   Patient presents with    Mental Health Problem     x 2 years battling depression Pt states recent stressors that have caused increase in symptoms Pt reports SI with plan but not acted on symptoms       History Provided By: Patient    HPI: Kaleigh Peres, 23 y.o. male with PMHx significant for migraines and depression, presents ambulatory to the ED with cc of progressively worsening depression which has worsened since November of 2017. Pt notes he has previously been seen by a counselor for his depression, but he stopped seeing her in November because she became unreliable. He is not currently on depression medication. Pt explains he is feeling like \"everyday it is something new\" and \"why me\". He reports that he sometimes feels like driving into traffic as he is driving down the road. Pt also c/o associated agitation, anxiety, sleep disturbance, and decreased appetite. Pt also reports \"seeing a figure when he wakes up\". He notes his sleep is sporadic. Pt has had previous similar symptoms in the [facility]. He denies SI and HI. Pt reports no associated pain. PCP: Carissa Schmitz MD    There are no other complaints, changes, or physical findings at this time. Past History     Past Medical History:  Past Medical History:   Diagnosis Date    Migraine        Past Surgical History:  History reviewed. No pertinent surgical history. Family History:  Family History   Problem Relation Age of Onset   [address] Migraines Mother     Migraines Maternal Aunt     Migraines Maternal Grandmother        Social History:  Social History   Substance Use Topics    Smoking status: Never Smoker    Smokeless tobacco: Never Used    Alcohol use No      Comment: occasional       Allergies:   Allergies   Allergen Reactions    Tramadol Itching         Review of Systems Review of Systems   Constitutional: Positive for appetite change. Negative for chills, diaphoresis and fever. HENT: Negative. Negative for congestion, ear pain, sore throat and trouble swallowing. Eyes: Negative. Negative for photophobia, pain, redness and visual disturbance. Respiratory: Negative. Negative for cough, chest tightness, shortness of breath and wheezing. Cardiovascular: Negative. Negative for chest pain and palpitations. Gastrointestinal: Negative. Negative for abdominal pain, blood in stool, diarrhea, nausea and vomiting. Genitourinary: Negative for dysuria and frequency. Musculoskeletal: Negative. Negative for back pain, joint swelling and neck pain. Skin: Negative. Neurological: Negative. Negative for seizures, syncope and headaches. Psychiatric/Behavioral: Positive for agitation and sleep disturbance. Negative for behavioral problems and confusion. The patient is nervous/anxious. + depression   All other systems reviewed and are negative. Physical Exam   Physical Exam   Constitutional: He is oriented to person, place, and time. He appears well-developed and well-nourished. HENT:   Head: Normocephalic and atraumatic. Eyes: Conjunctivae and EOM are normal.   Neck: Normal range of motion. Neck supple. Cardiovascular: Normal rate and regular rhythm. Pulmonary/Chest: Effort normal and breath sounds normal. No respiratory distress. Abdominal: Soft. He exhibits no distension. There is no tenderness. Musculoskeletal: Normal range of motion. Neurological: He is alert and oriented to person, place, and time. Skin: Skin is warm and dry. Psychiatric:   Flat affect   Nursing note and vitals reviewed. Medical Decision Making   I am the first provider for this patient. I reviewed the vital signs, available nursing notes, past medical history, past surgical history, family history and social history.     Vital Signs-Reviewed the patient's vital signs. Patient Vitals for the past 12 hrs:   Temp Resp BP SpO2   01/29/18 1257 98.7 °F (37.1 °C) 14 134/69 100 %       Pulse Oximetry Analysis - 100% on RA    Records Reviewed: Nursing Notes and Old Medical Records    Provider Notes (Medical Decision Making):   Patient presents with depression and no suicidal ideation. DDx:  2/2 MDD, schizoaffective d/o, bipolar, drug induced, organic cause such as electrolyte anomoly or infection. Will speak with mental health professional about possible admission. Comfortable with prescribing Hydroxyzine for his anxiety. He will need to follow up with behavioral health services for further evaluation of his depression. ED Course:   Initial assessment performed. The patients presenting problems have been discussed, and they are in agreement with the care plan formulated and outlined with them. I have encouraged them to ask questions as they arise throughout their visit. 2:40 PM  [facility] in department. 3:06 PM  Patient has been evaluated by [facility]. They state he is fine to follow up and does not need to be admitted. He is asking for something to help with his anxiety and difficulty sleeping. Disposition:  DISCHARGE NOTE  3:19 PM  The patient has been re-evaluated and is ready for discharge. Reviewed available results with patient. Counseled patient on diagnosis and care plan. Patient has expressed understanding, and all questions have been answered. Patient agrees with plan and agrees to follow up as recommended, or return to the ED if their symptoms worsen. Discharge instructions have been provided and explained to the patient, along with reasons to return to the ED. PLAN:  1. Current Discharge Medication List      START taking these medications    Details   hydrOXYzine HCl (ATARAX) 50 mg tablet Take 1 Tab by mouth every six (6) hours as needed for Anxiety for up to 10 days. Qty: 20 Tab, Refills: 0      melatonin 10 mg tab Take 10 mg by mouth nightly.   Qty: 30 Tab, Refills: 0           2. Follow-up Information     Follow up With Details Comments [id number] Tony Humphries MD   [facility]  [phone]          Return to ED if worse     Diagnosis     Clinical Impression:   1. Moderate single current episode of major depressive disorder ([facility] [address]        Attestations:    Attestation Note:  This note is prepared by [facility], acting as Scribe for MD James Rosales MD: The scribe's documentation has been prepared under my direction and personally reviewed by me in its entirety. I confirm that the note above accurately reflects all work, treatment, procedures, and medical decision making performed by me.

## 2018-01-29 NOTE — ED NOTES
Dr. Jasmin Vaca has reviewed discharge instructions with the patient. The patient verbalized understanding. Pt ambulatory home with discharge papers in hand.

## 2018-01-29 NOTE — ED NOTES
Pt arrived ambulatory from triage to room 39 with cc depression. Per pt he has hx of depression with SI and self harm. Today pt states he has been feeling down lately and having fleeting thoughts of SI though denies plan at this time. Pt states he would like to talk with someone about being prescribed something to help with his thoughts and his sleep. Per pt he has had a difficult time falling asleep and staying asleep. Pt in no acute distress. VSS.

## 2018-07-25 ENCOUNTER — HOSPITAL ENCOUNTER (EMERGENCY)
Age: 23
Discharge: HOME | End: 2018-07-25
Attending: EMERGENCY MEDICINE
Payer: COMMERCIAL

## 2018-07-25 VITALS
BODY MASS INDEX: 34.4 KG/M2 | DIASTOLIC BLOOD PRESSURE: 77 MMHG | SYSTOLIC BLOOD PRESSURE: 156 MMHG | WEIGHT: 253.97 LBS | RESPIRATION RATE: 18 BRPM | HEART RATE: 66 BPM | TEMPERATURE: 98.9 F | HEIGHT: 72 IN

## 2018-07-25 DIAGNOSIS — N18.9 CHRONIC KIDNEY DISEASE, UNSPECIFIED CKD STAGE: ICD-10-CM

## 2018-07-25 DIAGNOSIS — F32.89 OTHER DEPRESSION: Primary | ICD-10-CM

## 2018-07-25 LAB
ALBUMIN SERPL-MCNC: 4.6 G/DL (ref 3.5–5)
ALBUMIN/GLOB SERPL: 1.3 {RATIO} (ref 1.1–2.2)
ALP SERPL-CCNC: 66 U/L (ref 45–117)
ALT SERPL-CCNC: 23 U/L (ref 12–78)
AMPHET UR QL SCN: NEGATIVE
ANION GAP SERPL CALC-SCNC: 6 MMOL/L (ref 5–15)
APAP SERPL-MCNC: <2 UG/ML (ref 10–30)
APPEARANCE UR: CLEAR
AST SERPL-CCNC: 27 U/L (ref 15–37)
BACTERIA URNS QL MICRO: NEGATIVE /HPF
BARBITURATES UR QL SCN: NEGATIVE
BASOPHILS # BLD: 0 K/UL (ref 0–0.1)
BASOPHILS NFR BLD: 0 % (ref 0–1)
BENZODIAZ UR QL: NEGATIVE
BILIRUB SERPL-MCNC: 1.5 MG/DL (ref 0.2–1)
BILIRUB UR QL: NEGATIVE
BUN SERPL-MCNC: 14 MG/DL (ref 6–20)
BUN/CREAT SERPL: 9 (ref 12–20)
CALCIUM SERPL-MCNC: 9.6 MG/DL (ref 8.5–10.1)
CANNABINOIDS UR QL SCN: POSITIVE
CHLORIDE SERPL-SCNC: 106 MMOL/L (ref 97–108)
CO2 SERPL-SCNC: 26 MMOL/L (ref 21–32)
COCAINE UR QL SCN: NEGATIVE
COLOR UR: ABNORMAL
CREAT SERPL-MCNC: 1.61 MG/DL (ref 0.7–1.3)
DIFFERENTIAL METHOD BLD: NORMAL
DRUG SCRN COMMENT,DRGCM: ABNORMAL
EOSINOPHIL # BLD: 0 K/UL (ref 0–0.4)
EOSINOPHIL NFR BLD: 1 % (ref 0–7)
EPITH CASTS URNS QL MICRO: ABNORMAL /LPF
ERYTHROCYTE [DISTWIDTH] IN BLOOD BY AUTOMATED COUNT: 11.9 % (ref 11.5–14.5)
ETHANOL SERPL-MCNC: <10 MG/DL
GLOBULIN SER CALC-MCNC: 3.6 G/DL (ref 2–4)
GLUCOSE SERPL-MCNC: 79 MG/DL (ref 65–100)
GLUCOSE UR STRIP.AUTO-MCNC: NEGATIVE MG/DL
HCT VFR BLD AUTO: 44.8 % (ref 36.6–50.3)
HGB BLD-MCNC: 14.9 G/DL (ref 12.1–17)
HGB UR QL STRIP: NEGATIVE
IMM GRANULOCYTES # BLD: 0 K/UL (ref 0–0.04)
IMM GRANULOCYTES NFR BLD AUTO: 0 % (ref 0–0.5)
KETONES UR QL STRIP.AUTO: 40 MG/DL
LEUKOCYTE ESTERASE UR QL STRIP.AUTO: NEGATIVE
LYMPHOCYTES # BLD: 1.6 K/UL (ref 0.8–3.5)
LYMPHOCYTES NFR BLD: 23 % (ref 12–49)
MCH RBC QN AUTO: 30.2 PG (ref 26–34)
MCHC RBC AUTO-ENTMCNC: 33.3 G/DL (ref 30–36.5)
MCV RBC AUTO: 90.9 FL (ref 80–99)
METHADONE UR QL: NEGATIVE
MONOCYTES # BLD: 0.5 K/UL (ref 0–1)
MONOCYTES NFR BLD: 7 % (ref 5–13)
MUCOUS THREADS URNS QL MICRO: ABNORMAL /LPF
NEUTS SEG # BLD: 4.9 K/UL (ref 1.8–8)
NEUTS SEG NFR BLD: 69 % (ref 32–75)
NITRITE UR QL STRIP.AUTO: NEGATIVE
NRBC # BLD: 0 K/UL (ref 0–0.01)
NRBC BLD-RTO: 0 PER 100 WBC
OPIATES UR QL: NEGATIVE
PCP UR QL: NEGATIVE
PH UR STRIP: 6.5 [PH] (ref 5–8)
PLATELET # BLD AUTO: 203 K/UL (ref 150–400)
PMV BLD AUTO: 10.6 FL (ref 8.9–12.9)
POTASSIUM SERPL-SCNC: 3.8 MMOL/L (ref 3.5–5.1)
PROT SERPL-MCNC: 8.2 G/DL (ref 6.4–8.2)
PROT UR STRIP-MCNC: 30 MG/DL
RBC # BLD AUTO: 4.93 M/UL (ref 4.1–5.7)
RBC #/AREA URNS HPF: ABNORMAL /HPF (ref 0–5)
SALICYLATES SERPL-MCNC: <1.7 MG/DL (ref 2.8–20)
SODIUM SERPL-SCNC: 138 MMOL/L (ref 136–145)
SP GR UR REFRACTOMETRY: 1.01 (ref 1–1.03)
UA: UC IF INDICATED,UAUC: ABNORMAL
UROBILINOGEN UR QL STRIP.AUTO: 0.2 EU/DL (ref 0.2–1)
WBC # BLD AUTO: 7.1 K/UL (ref 4.1–11.1)
WBC URNS QL MICRO: ABNORMAL /HPF (ref 0–4)

## 2018-07-25 PROCEDURE — 81001 URINALYSIS AUTO W/SCOPE: CPT | Performed by: PHYSICIAN ASSISTANT

## 2018-07-25 PROCEDURE — 80053 COMPREHEN METABOLIC PANEL: CPT | Performed by: PHYSICIAN ASSISTANT

## 2018-07-25 PROCEDURE — 90791 PSYCH DIAGNOSTIC EVALUATION: CPT

## 2018-07-25 PROCEDURE — 80307 DRUG TEST PRSMV CHEM ANLYZR: CPT | Performed by: PHYSICIAN ASSISTANT

## 2018-07-25 PROCEDURE — 99283 EMERGENCY DEPT VISIT LOW MDM: CPT

## 2018-07-25 PROCEDURE — 36415 COLL VENOUS BLD VENIPUNCTURE: CPT | Performed by: PHYSICIAN ASSISTANT

## 2018-07-25 PROCEDURE — 85025 COMPLETE CBC W/AUTO DIFF WBC: CPT | Performed by: PHYSICIAN ASSISTANT

## 2018-07-25 NOTE — BSMART NOTE
Comprehensive Assessment Form Part 1    Section I - Disposition    Axis I - PTSD (per patient)   Axis II - Deferred  Axis III - Migraine  Axis IV - Relationship stressors  [address] - [address]      The Medical Doctor to Psychiatrist conference was not completed. The Medical Doctor is in agreement with Psychiatrist disposition because of (reason) patient is not requesting admission and does not meet admission criteria. The plan is discharge with outpatient therapy resources. The on-call Psychiatrist consulted was Dr. Selvin Montano. The admitting Psychiatrist will be Dr. Selvin Montano. The admitting Diagnosis is NA. The Payor source is [employer]. Section II - Integrated Summary  Summary:  Patient is a 20year old seen face to face in the ER. Patient came to the ER very distressed after assisting a friend with changing her bandages after she was discharged from the hospital after a serious suicide attempt. He reported he got overwhelmed and started thinking about his own history of cutting and feel helpless. His friend who cut is his ex-girlfriend and she has over 40 stitches in her arms from significant, vertical cutting. He stated once sitting back in the ER and calming down he knows he wants to be strong for his friend. He denied wanting to harm himself or kill himself. He also denied homicidal ideation or symptoms of psychosis. The last time he cut was in February 2016. He saw a counselor last year, but stopped going due to finances. He is now doing better financially and wants to return to counseling. He requested resources. The patient has demonstrated mental capacity to provide informed consent. The information is given by the patient and past medical records. The Chief Complaint is mental health problem. The Precipitant Factors are saw friend's suicide attempt cuts. Previous Hospitalizations: none  The patient has not previously been in restraints. Current Psychiatrist and/or  is NA.     Lethality Assessment:    The potential for suicide is not noted. The potential for homicide is not noted. The patient has not been a perpetrator of sexual or physical abuse. There are not pending charges. The patient is not felt to be at risk for self harm or harm to others. The attending nurse was advised that security has not been notified. Section III - Psychosocial  The patient's overall mood and attitude is euthymic. Feelings of helplessness and hopelessness are not observed. Generalized anxiety is not observed. Panic is not observed. Phobias are not observed. Obsessive compulsive tendencies are not observed. Section IV - Mental Status Exam  The patient's appearance shows no evidence of impairment. The patient's behavior shows no evidence of impairment. The patient is oriented to time, place, person and situation. The patient's speech shows no evidence of impairment. The patient's mood is euthymic. The range of affect shows no evidence of impairment. The patient's thought content demonstrates no evidence of impairment. The thought process shows no evidence of impairment. The patient's perception shows no evidence of impairment. The patient's memory shows no evidence of impairment. The patient's appetite shows no evidence of impairment. The patient's sleep shows no evidence of impairment. The patient's insight shows no evidence of impairment. The patient's judgement shows no evidence of impairment. Section V - Substance Abuse  The patient is using substances. The patient is using cannabis by inhalation for 1-5 years with last use on unknown. The patient has experienced the following withdrawal symptoms: N/A. Section VI - Living Arrangements  The patient is single. The patient lives with his parents. The patient has no children. The patient does plan to return home upon discharge. The patient does not have legal issues pending.  The patient's source of income comes from employment. Jain and cultural practices have not been voiced at this time. The patient's greatest support comes from his mother and this person will not be involved with the treatment. The patient has been in an event described as horrible or outside the realm of ordinary life experience either currently or in the past.  The patient has not been a victim of sexual/physical abuse. Section VII - Other Areas of Clinical Concern  The highest grade achieved is unknown with the overall quality of school experience being described as unknown. The patient is currently employed and speaks [place] as a primary language. The patient has no communication impairments affecting communication. The patient's preference for learning can be described as: can read and write adequately.   The patient's hearing is normal.  The patient's vision is normal.      Bella Mckenna

## 2018-07-25 NOTE — ED NOTES
Patient discharge instructions reviewed with patient by PA. Patient ambulatory off unit.  Patient left prior to obtaining repeat VS.

## 2018-07-25 NOTE — DISCHARGE INSTRUCTIONS
Blood Urea Nitrogen (BUN) Test: About This Test  What is it? A blood urea nitrogen (BUN) test measures the amount of nitrogen in your blood that comes from the waste product urea. Urea is made in the liver and passed out of your body in the urine. If your kidneys are not able to remove urea from the blood normally, your BUN level rises. Dehydration can also make your BUN level higher. A BUN test may be done with a blood creatinine test. The level of creatinine in your blood also tells how well your kidneys are working. A high creatinine level may mean your kidneys are not working properly. BUN and creatinine tests can be used together to find the BUN-to-creatinine ratio. Why is this test done? A BUN test is done to:  · See if your kidneys are working normally. · See if your kidney disease is getting worse. · See if treatment of your kidney disease is working. · Check for severe dehydration. Dehydration generally causes BUN levels to rise more than creatinine levels. This causes a high BUN-to-creatinine ratio. How can you prepare for the test?  · Do not eat a lot of meat or other protein in the 24 hours before having a BUN test.  What happens during the test?  · A health professional takes a sample of your blood. What else should you know about the test?  · Your results will include an explanation of what a \"normal\" result is. This is called a \"reference range. \" It is just a guide. Your doctor will evaluate your results based on your health and other factors. This means that a value that falls outside the normal values listed may still be normal for you. · Make sure your doctor knows all of the medicines, vitamins, herbal products, and supplements you take. What happens after the test?  · You will probably be able to go home right away. · You can go back to your usual activities right away. Follow-up care is a key part of your treatment and safety.  Be sure to make and go to all appointments, and call your doctor if you are having problems. It's also a good idea to keep a list of the medicines you take. Ask your doctor when you can expect to have your test results. Where can you learn more? Go to http://london-eloisa.info/. Enter B964 in the search box to learn more about \"Blood Urea Nitrogen (BUN) Test: About This Test.\"  Current as of: May 12, 2017  Content Version: 11.7  © 0210-1493 [employer]. Care instructions adapted under license by [employer] (which disclaims liability or warranty for this information). If you have questions about a medical condition or this instruction, always ask your healthcare professional. [address] any warranty or liability for your use of this information. Kidney Disease and High Blood Pressure: Care Instructions  Your Care Instructions    Long-term (chronic) kidney disease happens when the kidneys cannot remove waste and keep your body's fluids and chemicals in balance. Usually, the kidneys remove waste from the blood through the urine. When the kidneys are not working well, waste can build up so much that it poisons the body. Kidney disease can make you very tired. It also can cause swelling, or edema, in your legs or other areas of your body. High blood pressure is one of the major causes of chronic kidney disease. And kidney disease can also cause high blood pressure. No matter which came first, having high blood pressure damages the tiny blood vessels in the kidneys. If you have high blood pressure, it is important to lower it. There are many things you can do to lower your blood pressure, which may help slow or stop the damage to your kidneys. Follow-up care is a key part of your treatment and safety. Be sure to make and go to all appointments, and call your doctor if you are having problems. It's also a good idea to know your test results and keep a list of the medicines you take.   How can you care for yourself at home? · Be safe with medicines. Take your medicines exactly as prescribed. Call your doctor if you have any problems with your medicine. You will probably need more than one medicine to lower your blood pressure. You will get more details on the specific medicines your doctor prescribes. · Work with your doctor and a dietitian to plan meals that have the right amount of nutrients for you. You will probably have to limit salt, fluids, and protein. · Stay at a healthy weight. This is very important if you put on weight around the waist. Losing even 10 pounds can help you lower your blood pressure. · Manage other health problems such as diabetes and high cholesterol. You can help lower your risk for heart disease and blood vessel problems with a healthy lifestyle along with medicines. · Do not take ibuprofen (Advil, Motrin) or naproxen (Aleve), or similar medicines, unless your doctor tells you to. They may make chronic kidney disease worse. It is okay to take acetaminophen (Tylenol). · If your doctor recommends it, get more exercise. Walking is a good choice. Bit by bit, increase the amount you walk every day. Try for at least 30 minutes on most days of the week. You also may want to swim, bike, or do other activities. · Limit or avoid alcohol. Talk to your doctor about whether you can drink any alcohol. · Do not smoke or allow others to smoke around you. If you need help quitting, talk to your doctor about stop-smoking programs and medicines. These can increase your chances of quitting for good. When should you call for help? Call 911 anytime you think you may need emergency care.  For example, call if:    · You passed out (lost consciousness).    Call your doctor now or seek immediate medical care if:    · You have new or worse nausea and vomiting.     · You have much less urine than normal, or you have no urine.     · You are feeling confused or cannot think clearly.     · You have new or more blood in your urine.     · You have new swelling.     · You are dizzy or lightheaded, or you feel like you may faint.    Watch closely for changes in your health, and be sure to contact your doctor if:    · You do not get better as expected. Where can you learn more? Go to http://london-eloisa.info/. Enter O166 in the search box to learn more about \"Kidney Disease and High Blood Pressure: Care Instructions. \"  Current as of: May 12, 2017  Content Version: 11.7  © 5958-2638 [employer]. Care instructions adapted under license by [employer] (which disclaims liability or warranty for this information). If you have questions about a medical condition or this instruction, always ask your healthcare professional. [address] any warranty or liability for your use of this information. Low Sodium Diet (2,000 Milligram): Care Instructions  Your Care Instructions    Too much sodium causes your body to hold on to extra water. This can raise your blood pressure and force your heart and kidneys to work harder. In very serious cases, this could cause you to be put in the hospital. It might even be life-threatening. By limiting sodium, you will feel better and lower your risk of serious problems. The most common source of sodium is salt. People get most of the salt in their diet from canned, prepared, and packaged foods. Fast food and restaurant meals also are very high in sodium. Your doctor will probably limit your sodium to less than 2,000 milligrams (mg) a day. This limit counts all the sodium in prepared and packaged foods and any salt you add to your food. Follow-up care is a key part of your treatment and safety. Be sure to make and go to all appointments, and call your doctor if you are having problems. It's also a good idea to know your test results and keep a list of the medicines you take.   How can you care for yourself at home?  Read food labels  · Read labels on cans and food packages. The labels tell you how much sodium is in each serving. Make sure that you look at the serving size. If you eat more than the serving size, you have eaten more sodium. · Food labels also tell you the Percent Daily Value for sodium. Choose products with low Percent Daily Values for sodium. · Be aware that sodium can come in forms other than salt, including monosodium glutamate (MSG), sodium citrate, and sodium bicarbonate (baking soda). MSG is often added to Asian food. When you eat out, you can sometimes ask for food without MSG or added salt. Buy low-sodium foods  · Buy foods that are labeled \"unsalted\" (no salt added), \"sodium-free\" (less than 5 mg of sodium per serving), or \"low-sodium\" (less than 140 mg of sodium per serving). Foods labeled \"reduced-sodium\" and \"light sodium\" may still have too much sodium. Be sure to read the label to see how much sodium you are getting. · Buy fresh vegetables, or frozen vegetables without added sauces. Buy low-sodium versions of canned vegetables, soups, and other canned goods. Prepare low-sodium meals  · Cut back on the amount of salt you use in cooking. This will help you adjust to the taste. Do not add salt after cooking. One teaspoon of salt has about 2,300 mg of sodium. · Take the salt shaker off the table. · Flavor your food with garlic, lemon juice, onion, vinegar, herbs, and spices. Do not use soy sauce, lite soy sauce, steak sauce, onion salt, garlic salt, celery salt, mustard, or ketchup on your food. · Use low-sodium salad dressings, sauces, and ketchup. Or make your own salad dressings and sauces without adding salt. · Use less salt (or none) when recipes call for it. You can often use half the salt a recipe calls for without losing flavor. Other foods such as rice, pasta, and grains do not need added salt. · Rinse canned vegetables, and cook them in fresh water.  This removes some-but not all-of the salt. · Avoid water that is naturally high in sodium or that has been treated with water softeners, which add sodium. Call your local water company to find out the sodium content of your water supply. If you buy bottled water, read the label and choose a sodium-free brand. Avoid high-sodium foods  · Avoid eating:  ¨ Smoked, cured, salted, and canned meat, fish, and poultry. ¨ Ham, sims, hot dogs, and luncheon meats. ¨ Regular, hard, and processed cheese and regular peanut butter. ¨ Crackers with salted tops, and other salted snack foods such as pretzels, chips, and salted popcorn. ¨ Frozen prepared meals, unless labeled low-sodium. ¨ Canned and dried soups, broths, and bouillon, unless labeled sodium-free or low-sodium. ¨ Canned vegetables, unless labeled sodium-free or low-sodium. ¨ [place] fries, pizza, tacos, and other fast foods. ¨ Pickles, olives, ketchup, and other condiments, especially soy sauce, unless labeled sodium-free or low-sodium. Where can you learn more? Go to http://london-eloisa.info/. Enter D489 in the search box to learn more about \"Low Sodium Diet (2,000 Milligram): Care Instructions. \"  Current as of: May 12, 2017  Content Version: 11.7  © 9308-2958 [employer]. Care instructions adapted under license by [employer] (which disclaims liability or warranty for this information). If you have questions about a medical condition or this instruction, always ask your healthcare professional. [place] [address] any warranty or liability for your use of this information. Preventing a Relapse of Depression: Care Instructions  Your Care Instructions    A relapse of depression means your symptoms have come back after you have gotten better. This illness often comes and goes during a lifetime. But there are many things you can do to keep it from coming back. Follow-up care is a key part of your treatment and safety.  Be sure to make and go to all appointments, and call your doctor if you are having problems. It's also a good idea to know your test results and keep a list of the medicines you take. What do you need to know? Know your risk of relapse  Talk to your doctor to find out if you are at risk of relapse. Many things can make a person more likely to relapse into depression. These include having a family member with depression, dealing with serious problems in a relationship or a job, having a serious medical condition, or abusing drugs or alcohol. It is important to know your risk and to recognize warning signs of relapse. Once you know these things, you will be better able to keep it from happening to you. Know the warning signs of relapse  The two most common signs of relapse are:  · Feeling sad or hopeless. · Losing interest in your daily activities. You may have other symptoms, such as:  · You lose or gain weight. · You sleep too much or not enough. · You feel restless and unable to sit still. · You feel unable to move. · You feel tired all the time. · You feel unworthy or guilty without an obvious reason. · You have problems concentrating, remembering, or making decisions. · You think often about death or suicide. · You feel angry or have panic attacks. How can you care for yourself at home? · Take your medicine as prescribed. Call your doctor if you have any problems with your medicine. Many people take their medicines for at least 6 months after they have recovered. This often helps keep symptoms from coming back. However, if your depression keeps coming back, you may have to take medicine for the rest of your life. · Continue counseling even after you have stopped taking medicine. · Eat healthy foods. Include fruits, vegetables, beans, and whole grains in your diet each day. · Get at least 30 minutes of exercise on most days of the week. Walking is a good choice.  You also may want to do other activities, such as running, swimming, cycling, or playing tennis or team sports. · See your doctor right away if you have new symptoms or feel that your depression is coming back. · Keep a regular sleep schedule. Try for 8 hours of sleep a night. · Avoid alcohol and illegal drugs. · Keep the numbers for these national suicide hotlines: 8-134-773-TALK (6-103.341.8366) and 8-795-RPJBZLG (2-563.318.2498). If you or someone you know talks about suicide or feeling hopeless, get help right away. When should you call for help? Call 911 anytime you think you may need emergency care. For example, call if:    · You are thinking about suicide or are threatening suicide.     · You feel you cannot stop from hurting yourself or someone else.     · You hear or see things that aren't real.     · You think or speak in a bizarre way that is not like your usual behavior.    Call your doctor now or seek immediate medical care if:    · You are drinking a lot of alcohol or using illegal drugs.     · You are talking or writing about death.    Watch closely for changes in your health, and be sure to contact your doctor if:    · You find it hard or it's getting harder to deal with school, a job, family, or friends.     · You think your treatment is not helping or you are not getting better.     · Your symptoms get worse or you get new symptoms.     · You have any problems with your antidepressant medicines, such as side effects, or you are thinking about stopping your medicine.     · You are having manic behavior, such as having very high energy, needing less sleep than normal, or showing risky behavior such as spending money you don't have or abusing others verbally or physically. Where can you learn more? Go to http://london-eloisa.info/. Enter Y392 in the search box to learn more about \"Preventing a Relapse of Depression: Care Instructions. \"  Current as of: December 7, 2017  Content Version: 11.7  © 5138-6873 [employer]. Care instructions adapted under license by [employer] (which disclaims liability or warranty for this information). If you have questions about a medical condition or this instruction, always ask your healthcare professional. [address] any warranty or liability for your use of this information. Recovering From Depression: Care Instructions  Your Care Instructions    Taking good care of yourself is important as you recover from depression. In time, your symptoms will fade as your treatment takes hold. Do not give up. Instead, focus your energy on getting better. Your mood will improve. It just takes some time. Focus on things that can help you feel better, such as being with friends and family, eating well, and getting enough rest. But take things slowly. Do not do too much too soon. You will begin to feel better gradually. Follow-up care is a key part of your treatment and safety. Be sure to make and go to all appointments, and call your doctor if you are having problems. It's also a good idea to know your test results and keep a list of the medicines you take. How can you care for yourself at home? Be realistic  · If you have a large task to do, break it up into smaller steps you can handle, and just do what you can. · You may want to put off important decisions until your depression has lifted. If you have plans that will have a major impact on your life, such as marriage, divorce, or a job change, try to wait a bit. Talk it over with friends and loved ones who can help you look at the overall picture first.  · Reaching out to people for help is important. Do not isolate yourself. Let your family and friends help you. Find someone you can trust and confide in, and talk to that person. · Be patient, and be kind to yourself. Remember that depression is not your fault and is not something you can overcome with willpower alone. Treatment is necessary for depression, just like for any other illness. Feeling better takes time, and your mood will improve little by little. Stay active  · Stay busy and get outside. Take a walk, or try some other light exercise. · Talk with your doctor about an exercise program. Exercise can help with mild depression. · Go to a movie or concert. Take part in a Adventist activity or other social gathering. Go to a ball game. · Ask a friend to have dinner with you. Take care of yourself  · Eat a balanced diet with plenty of fresh fruits and vegetables, whole grains, and lean protein. If you have lost your appetite, eat small snacks rather than large meals. · Avoid drinking alcohol or using illegal drugs. Do not take medicines that have not been prescribed for you. They may interfere with medicines you may be taking for depression, or they may make your depression worse. · Take your medicines exactly as they are prescribed. You may start to feel better within 1 to 3 weeks of taking antidepressant medicine. But it can take as many as 6 to 8 weeks to see more improvement. If you have questions or concerns about your medicines, or if you do not notice any improvement by 3 weeks, talk to your doctor. · If you have any side effects from your medicine, tell your doctor. Antidepressants can make you feel tired, dizzy, or nervous. Some people have dry mouth, constipation, headaches, sexual problems, or diarrhea. Many of these side effects are mild and will go away on their own after you have been taking the medicine for a few weeks. Some may last longer. Talk to your doctor if side effects are bothering you too much. You might be able to try a different medicine. · Get enough sleep. If you have problems sleeping:  ¨ Go to bed at the same time every night, and get up at the same time every morning. ¨ Keep your bedroom dark and quiet. ¨ Do not exercise after 5:00 p.m.   ¨ Avoid drinks with caffeine after 5:00 p.m.  · Avoid sleeping pills unless they are prescribed by the doctor treating your depression. Sleeping pills may make you groggy during the day, and they may interact with other medicine you are taking. · If you have any other illnesses, such as diabetes, heart disease, or high blood pressure, make sure to continue with your treatment. Tell your doctor about all of the medicines you take, including those with or without a prescription. · Keep the numbers for these national suicide hotlines: 3-531-542-TALK (4-282.269.1076) and 5-054-NKMFELX (7-716.488.8030). If you or someone you know talks about suicide or feeling hopeless, get help right away. When should you call for help? Call 911 anytime you think you may need emergency care. For example, call if:    · You feel like hurting yourself or someone else.     · Someone you know has depression and is about to attempt or is attempting suicide.   [facility] your doctor now or seek immediate medical care if:    · You hear voices.     · Someone you know has depression and:  ¨ Starts to give away his or her possessions. ¨ Uses illegal drugs or drinks alcohol heavily. ¨ Talks or writes about death, including writing suicide notes or talking about guns, knives, or pills. ¨ Starts to spend a lot of time alone. ¨ Acts very aggressively or suddenly appears calm.    Watch closely for changes in your health, and be sure to contact your doctor if:    · You do not get better as expected. Where can you learn more? Go to http://london-eloisa.info/. Enter R330 in the search box to learn more about \"Recovering From Depression: Care Instructions. \"  Current as of: December 7, 2017  Content Version: 11.7  © 3154-0335 [employer]. Care instructions adapted under license by [employer] (which disclaims liability or warranty for this information).  If you have questions about a medical condition or this instruction, always ask your healthcare professional. [address] any warranty or liability for your use of this information. [facility] Departments     For adult and child immunizations, family planning, TB screening, STD testing and women's health services. [facility]: [place] [phone]      [facility] 25   [address]   [address]   [address] [facility] Street: [place] [address] [phone]      [address]          [address] [place] [address]     For primary care services, woman and child wellness, and some clinics providing specialty care. [facility] -- [facility]. 2525 [facility] [phone]   411 [facility] 200 [facility] 3614 [facility] [phone]   339 [facility] End [facility] [address] [phone] [address] [facility] [address] [place] [address] 5850  Community  [phone]   [address] [place] [address] [address] [place] [phone]   [facility] [address] [phone]   [facility] [address] [place] [address] [phone]   [facility]: [facility] [address] [facility] [address] [phone]     [address] [phone]   [facility] Outreach [address]50  Community  [phone]   [facility]  [address] ([email or web address]) [phone]         Sexual Health/Woman Wellness Clinics    For STD/HIV testing and treatment, pregnancy testing and services, men's health, birth control services, LGBT services, and hepatitis/HPV vaccine services. [facility] [place] [employer] [address]. [facility] [address] [facility] [address] [facility] 1571 [address] [phone]   [facility] [address], 5th floor [phone]   Pregnancy 2050 [facility] 2500 [facility] 118 [facility] [phone] [address] [facility] [address] [place] [address]   [phone]   [place]   [phone]   Women, Infant and Children's Services: [address] [phone]       600 [facility]   [phone]   [address]   [facility]     [phone]   [address] Crisis   1212 [facility] [phone]     Local Primary Care Physicians  [facility] [phone]  MD Doc So MD Delwin Lemon, MD [facility] Doctors [phone]  [place] Sahni, P  MD Nery Cortés, MD Kira Altman Aas, [place] [address] [phone]  MD Melodie Ryan [place] [address] [facility] [phone]  Marcheta Krabbe, MD Bettey Fryer, MD Guilford Kroner, MD Elner Picking, MD   [facility] [phone]  QTWM MFIVYP GX, MD Lupe Gibbs, MD Shaila Christiansen, NP [facility] [phone]  MD Ho Smith MD Caroleen Beards, MD Cinthia Course, MD Lior Contreras, MD Kian Hendrix MD Cherl Grain [facility]  Michelle Bledsoe MD [address] [facility] [phone]  Natan Bazzi, MD Milla Perez, NP  Teo Rodríguez, MD Grey Ryan, MD Maxwell Garcia, MD Dilip Aaron, MD Sarahi Bazan MD   [address] [facility] [phone]  MD Ramos Solares, P  Liam Castle, NP  Tenisha Luque, MD Isra Mason, MD Hilaria Martinez, MD Roney Anne MD [facility] [phone]  Amos Rue, MD  MitMD Kiana Wood MD Pecola Raymond, MD Raenelle Perch, MD   [facility] [phone]  Ros Brochure, MD Mari Brothers [phone]  MD Ramo Arvizu MD Elvina Maus, MD   [facility] [phone]  Galen Martinez, MD Yair Benjamin, MD Taty Matias, MD Ashleigh Schofield, MD Bettina Simpson, PEPE Dennison MD [address]  66   [phone]  Tarun Neff, MD Kraig Austin MD   [address] [facility] [phone]  [address] [place] [address] MD Ronnie Worthy, FNP  Cristina Shaffer, SALAZAR Shaffer, FNP  Otilia Harris, PAMD Erwin Rojas, PEPE Rivers, DO Miscellaneous:  Korina Beyer [phone]

## 2018-07-25 NOTE — ED PROVIDER NOTES
EMERGENCY DEPARTMENT HISTORY AND PHYSICAL EXAM    Date: 7/25/2018  Patient Name: Juan Antonio Manning    History of Presenting Illness     Chief Complaint   Patient presents with    Mental Health Problem     His friend tried to kill himself and he is very distraught about the circumstances. Very tearful in triage. No plan to hurt himself just very upset. HPI: 20 yom w/ hx of suicide attempt via cutting left wrist last year, depression, presents to the ED for depression. Pt says his friend attempted suicide via cutting her wrists and he says she was admitted and then d/c'd recently. He went to visit his friend and says he helped her change her bandages and her cuts were so deep that it made him very upset. He says because he has a hx of cutting himself he came to the ED to talk with someone. Pt denies SI, HI, auditory/visual hallucinations, attempting to harm himself in any way today, among other assoc sx's/hx. Current Outpatient Prescriptions   Medication Sig Dispense Refill    melatonin 10 mg tab Take 10 mg by mouth nightly. 30 Tab 0       Past History     Past Medical History:  Past Medical History:   Diagnosis Date    Migraine        Past Surgical History:  No past surgical history on file. Family History:  Family History   Problem Relation Age of Onset   [place] Migraines Mother     Migraines Maternal Aunt     Migraines Maternal Grandmother        Social History:  Social History   Substance Use Topics    Smoking status: Never Smoker    Smokeless tobacco: Never Used    Alcohol use No      Comment: occasional       Allergies: Allergies   Allergen Reactions    Tramadol Itching         Review of Systems   Review of Systems   Constitutional: Negative for chills, fever and unexpected weight change. Respiratory: Negative for shortness of breath. Cardiovascular: Negative for chest pain. Gastrointestinal: Negative for nausea and vomiting. Musculoskeletal: Negative for arthralgias and myalgias. Skin: Negative for rash. Neurological: Negative for light-headedness and headaches. Psychiatric/Behavioral: Positive for suicidal ideas. Negative for agitation, behavioral problems, confusion, decreased concentration, dysphoric mood, hallucinations, self-injury and sleep disturbance. The patient is not nervous/anxious and is not hyperactive. All other systems reviewed and are negative. Physical Exam     Vitals:    07/25/18 1625   BP: 156/77   Pulse: 66   Resp: 18   Temp: 98.9 °F (37.2 °C)   Weight: 115.2 kg (253 lb 15.5 oz)   Height: 6' (1.829 m)     Physical Exam   Constitutional: He is oriented to person, place, and time. He appears well-developed and well-nourished. HENT:   Head: Normocephalic and atraumatic. Cardiovascular: Normal rate, regular rhythm and normal heart sounds. Pulmonary/Chest: Effort normal and breath sounds normal.   Abdominal: Soft. Bowel sounds are normal. He exhibits no distension and no mass. There is no tenderness. There is no rebound and no guarding. Neurological: He is alert and oriented to person, place, and time. Skin: Skin is warm and dry. Psychiatric: He has a normal mood and affect. His behavior is normal. Judgment and thought content normal.   Nursing note and vitals reviewed.         Diagnostic Study Results     Labs -     Recent Results (from the past 12 hour(s))   CBC WITH AUTOMATED DIFF    Collection Time: 07/25/18  5:27 PM   Result Value Ref Range    WBC 7.1 4.1 - 11.1 K/uL    RBC 4.93 4.10 - 5.70 M/uL    HGB 14.9 12.1 - 17.0 g/dL    HCT 44.8 36.6 - 50.3 %    MCV 90.9 80.0 - 99.0 FL    MCH 30.2 26.0 - 34.0 PG    MCHC 33.3 30.0 - 36.5 g/dL    RDW 11.9 11.5 - 14.5 %    PLATELET 861 198 - 717 K/uL    MPV 10.6 8.9 - 12.9 FL    NRBC 0.0 0  WBC    ABSOLUTE NRBC 0.00 0.00 - 0.01 K/uL    NEUTROPHILS 69 32 - 75 %    LYMPHOCYTES 23 12 - 49 %    MONOCYTES 7 5 - 13 %    EOSINOPHILS 1 0 - 7 %    BASOPHILS 0 0 - 1 %    IMMATURE GRANULOCYTES 0 0.0 - 0.5 % ABS. NEUTROPHILS 4.9 1.8 - 8.0 K/UL    ABS. LYMPHOCYTES 1.6 0.8 - 3.5 K/UL    ABS. MONOCYTES 0.5 0.0 - 1.0 K/UL    ABS. EOSINOPHILS 0.0 0.0 - 0.4 K/UL    ABS. BASOPHILS 0.0 0.0 - 0.1 K/UL    ABS. IMM. GRANS. 0.0 0.00 - 0.04 K/UL    DF AUTOMATED     METABOLIC PANEL, COMPREHENSIVE    Collection Time: 07/25/18  5:27 PM   Result Value Ref Range    Sodium 138 136 - 145 mmol/L    Potassium 3.8 3.5 - 5.1 mmol/L    Chloride 106 97 - 108 mmol/L    CO2 26 21 - 32 mmol/L    Anion gap 6 5 - 15 mmol/L    Glucose 79 65 - 100 mg/dL    BUN 14 6 - 20 MG/DL    Creatinine 1.61 (H) 0.70 - 1.30 MG/DL    BUN/Creatinine ratio 9 (L) 12 - 20      GFR est AA >60 >60 ml/min/1.73m2    GFR est non-AA 53 (L) >60 ml/min/1.73m2    Calcium 9.6 8.5 - 10.1 MG/DL    Bilirubin, total 1.5 (H) 0.2 - 1.0 MG/DL    ALT (SGPT) 23 12 - 78 U/L    AST (SGOT) 27 15 - 37 U/L    Alk.  phosphatase 66 45 - 117 U/L    Protein, total 8.2 6.4 - 8.2 g/dL    Albumin 4.6 3.5 - 5.0 g/dL    Globulin 3.6 2.0 - 4.0 g/dL    A-G Ratio 1.3 1.1 - 2.2     ETHYL ALCOHOL    Collection Time: 07/25/18  5:27 PM   Result Value Ref Range    ALCOHOL(ETHYL),SERUM <10 <10 MG/DL   ACETAMINOPHEN    Collection Time: 07/25/18  5:27 PM   Result Value Ref Range    Acetaminophen level <2 (L) 10 - 30 ug/mL   SALICYLATE    Collection Time: 07/25/18  5:27 PM   Result Value Ref Range    Salicylate level <9.2 (L) 2.8 - 20.0 MG/DL   DRUG SCREEN, URINE    Collection Time: 07/25/18  5:27 PM   Result Value Ref Range    AMPHETAMINES NEGATIVE  NEG      BARBITURATES NEGATIVE  NEG      BENZODIAZEPINES NEGATIVE  NEG      COCAINE NEGATIVE  NEG      METHADONE NEGATIVE  NEG      OPIATES NEGATIVE  NEG      PCP(PHENCYCLIDINE) NEGATIVE  NEG      THC (TH-CANNABINOL) POSITIVE (A) NEG      Drug screen comment (NOTE)    URINALYSIS W/ REFLEX CULTURE    Collection Time: 07/25/18  5:27 PM   Result Value Ref Range    Color DARK YELLOW      Appearance CLEAR CLEAR      Specific gravity 1.015 1.003 - 1.030      pH (UA) 6.5 5.0 - 8.0      Protein 30 (A) NEG mg/dL    Glucose NEGATIVE  NEG mg/dL    Ketone 40 (A) NEG mg/dL    Bilirubin NEGATIVE  NEG      Blood NEGATIVE  NEG      Urobilinogen 0.2 0.2 - 1.0 EU/dL    Nitrites NEGATIVE  NEG      Leukocyte Esterase NEGATIVE  NEG      WBC 0-4 0 - 4 /hpf    RBC 0-5 0 - 5 /hpf    Epithelial cells FEW FEW /lpf    Bacteria NEGATIVE  NEG /hpf    UA:UC IF INDICATED CULTURE NOT INDICATED BY UA RESULT CNI      Mucus 3+ (A) NEG /lpf       Radiologic Studies -   No orders to display     CT Results  (Last 48 hours)    None        CXR Results  (Last 48 hours)    None            Medical Decision Making   I am the first provider for this patient. I reviewed the vital signs, available nursing notes, past medical history, past surgical history, family history and social history. Vital Signs-Reviewed the patient's vital signs. Records Reviewed: Nursing Notes    ED Course:     Pt was reassessed and still reports he is not suicidal.     Psych: No medical problems identified which would require immediate intervention or which would preclude psychiatric evaluation. Kathia did a psych eval on the pt and says he is not suicidal and is safe to go home w/ a plan for follow up. I agree with her eval at this time. Disposition:  Discharged to home  DISCHARGE NOTE:   The patient has been re-evaluated and is ready for discharge. Patient has no new complaints, changes, or physical findings. I Counseled the patient on diagnosis and care plan. All available lab and imaging results have been reviewed by me and were discussed with the patient, including all incidental findings. The likelihood of other entities in the differential is insufficient to justify any further testing for them. This was explained to the patient. Patient agrees with plan and agrees to follow up with psychiatric services as recommended, or return to the ED if their symptoms worsen.  All medications were reviewed with the patient; will d/c home with no meds. All of pt's questions and concerns were addressed. The patient was advised that new or worsening symptoms would require further evaluation and should prompt immediate return to the Emergency Department. Discharge instructions have been provided and explained to the patient, along with reasons to return to the ED. Patient voices understanding and is agreeable with the plan for discharge. Patient is ready to go home. Follow-up Information     Follow up With Details Comments [phone] Tony Humphries MD Call today            Discharge Medication List as of 7/25/2018  6:44 PM          Provider Notes (Medical Decision Making):   DDx: normal grief response, SI, alcohol/sedative/opiate abuse, stimulant withdrawal, anxiety d/o, domestic violence, drug-induced depression, chronic medical illness, personality d/o. Low concern for bipolar d/o, hypothyroidism  Procedures:  Procedures        Diagnosis     Clinical Impression:   1. Other depression    2.  Chronic kidney disease, unspecified CKD stage

## 2022-03-19 PROBLEM — H05.89 ORBITAL MASS: Status: ACTIVE | Noted: 2017-06-29

## 2022-03-19 PROBLEM — R51.9: Status: ACTIVE | Noted: 2017-06-29

## 2022-03-19 PROBLEM — H53.9 TRANSIENT VISION DISTURBANCE OF RIGHT EYE: Status: ACTIVE | Noted: 2017-06-29

## 2022-12-06 ENCOUNTER — OFFICE VISIT (OUTPATIENT)
Dept: URGENT CARE | Age: 27
End: 2022-12-06

## 2022-12-06 VITALS
DIASTOLIC BLOOD PRESSURE: 88 MMHG | TEMPERATURE: 98.4 F | HEART RATE: 58 BPM | RESPIRATION RATE: 18 BRPM | OXYGEN SATURATION: 98 % | SYSTOLIC BLOOD PRESSURE: 132 MMHG

## 2022-12-06 DIAGNOSIS — J10.1 INFLUENZA A VIRUS PRESENT: ICD-10-CM

## 2022-12-06 DIAGNOSIS — R68.83 CHILLS: ICD-10-CM

## 2022-12-06 DIAGNOSIS — R05.1 ACUTE COUGH: ICD-10-CM

## 2022-12-06 DIAGNOSIS — Z87.01 HISTORY OF BACTERIAL PNEUMONIA: Primary | ICD-10-CM

## 2022-12-06 LAB
FLUAV+FLUBV AG NOSE QL IA.RAPID: NEGATIVE
FLUAV+FLUBV AG NOSE QL IA.RAPID: POSITIVE
SARS-COV-2 PCR, POC: NEGATIVE
VALID INTERNAL CONTROL?: YES

## 2022-12-06 PROCEDURE — 87804 INFLUENZA ASSAY W/OPTIC: CPT | Performed by: INTERNAL MEDICINE

## 2022-12-06 PROCEDURE — 87635 SARS-COV-2 COVID-19 AMP PRB: CPT | Performed by: INTERNAL MEDICINE

## 2022-12-06 PROCEDURE — 99202 OFFICE O/P NEW SF 15 MIN: CPT | Performed by: INTERNAL MEDICINE

## 2022-12-06 RX ORDER — OSELTAMIVIR PHOSPHATE 75 MG/1
75 CAPSULE ORAL 2 TIMES DAILY
Qty: 10 CAPSULE | Refills: 0 | Status: SHIPPED | OUTPATIENT
Start: 2022-12-06 | End: 2022-12-11

## 2022-12-06 RX ORDER — OSELTAMIVIR PHOSPHATE 75 MG/1
75 CAPSULE ORAL 2 TIMES DAILY
Qty: 10 CAPSULE | Refills: 0 | Status: SHIPPED | OUTPATIENT
Start: 2022-12-06 | End: 2022-12-06 | Stop reason: SDUPTHER

## 2022-12-06 NOTE — PROGRESS NOTES
HISTORY OF PRESENT ILLNESS  Myriam Verma is a 32 y.o. male. Patient was seen after he began to have body aches, cough, fever and night sweats that began this weekend. Reports his GF had the same. Fever broke as of yesterday. Is able to keep down food and fluids. No CP. History of PNA and bronchitis. Visit Vitals  /88   Pulse (!) 58   Temp 98.4 °F (36.9 °C)   Resp 18   SpO2 98%     Past Medical History:   Diagnosis Date    Migraine      History reviewed. No pertinent surgical history. Family History   Problem Relation Age of Onset    Migraines Mother     Migraines Maternal Aunt     Migraines Maternal Grandmother      Outpatient Encounter Medications as of 12/6/2022   Medication Sig Dispense Refill    [DISCONTINUED] melatonin 10 mg tab Take 10 mg by mouth nightly. 30 Tab 0     No facility-administered encounter medications on file as of 12/6/2022. HPI    Review of Systems   Constitutional:  Positive for chills and diaphoresis. HENT:  Positive for congestion. Negative for sore throat. Respiratory:  Positive for cough. Cardiovascular: Negative. Gastrointestinal: Negative. Musculoskeletal:  Positive for myalgias. Neurological:  Negative for dizziness and headaches. Physical Exam  Vitals and nursing note reviewed. Constitutional:       Appearance: He is diaphoretic. HENT:      Right Ear: Tympanic membrane normal.      Left Ear: Tympanic membrane normal.      Nose: Congestion present. Mouth/Throat:      Pharynx: Oropharynx is clear. Eyes:      Pupils: Pupils are equal, round, and reactive to light. Cardiovascular:      Rate and Rhythm: Normal rate and regular rhythm. Pulmonary:      Effort: Pulmonary effort is normal. No respiratory distress. Breath sounds: Normal breath sounds. Abdominal:      Palpations: Abdomen is soft. Musculoskeletal:      Cervical back: Neck supple. Lymphadenopathy:      Cervical: No cervical adenopathy.    Skin:     General: Skin is warm.   Neurological:      Mental Status: He is alert and oriented to person, place, and time. ASSESSMENT and PLAN  Diagnoses and all orders for this visit:    1. History of bacterial pneumonia  -     XR CHEST PA LAT; Future    2. Acute cough  -     XR CHEST PA LAT; Future  -     AMB POC KP INFLUENZA A/B TEST  -     POCT COVID-19, SARS-COV-2, PCR    3. Chills  -     AMB POC KP INFLUENZA A/B TEST  -     POCT COVID-19, SARS-COV-2, PCR    4. Influenza A virus present  -     oseltamivir (TAMIFLU) 75 mg capsule; Take 1 Capsule by mouth two (2) times a day for 5 days.       Follow-up and Dispositions    Return if symptoms worsen or fail to improve.       lab results and schedule of future lab studies reviewed with patient  reviewed medications and side effects in detail

## 2022-12-06 NOTE — LETTER
NOTIFICATION RETURN TO WORK / SCHOOL    12/6/2022 6:06 PM    Mr. Wilkins Goldberg Coppin  [address] [place] [address]      To Whom It May Concern:    Darlene Coffey is currently under the care of [address]. He will return to work/school on: 12/8/22    Patient began with symptoms as of Saturday night. Presents to the urgent care today, 12/6/22 and tested positive for flu. Is being treated. If there are questions or concerns please have the patient contact our office.         Sincerely,      Delfina Cosme NP

## 2023-07-25 ENCOUNTER — APPOINTMENT (OUTPATIENT)
Facility: HOSPITAL | Age: 28
End: 2023-07-25

## 2023-07-25 ENCOUNTER — HOSPITAL ENCOUNTER (EMERGENCY)
Facility: HOSPITAL | Age: 28
Discharge: HOME | End: 2023-07-25

## 2023-07-25 VITALS
BODY MASS INDEX: 34.92 KG/M2 | OXYGEN SATURATION: 98 % | HEART RATE: 90 BPM | RESPIRATION RATE: 16 BRPM | HEIGHT: 75 IN | TEMPERATURE: 99 F | SYSTOLIC BLOOD PRESSURE: 91 MMHG | WEIGHT: 280.87 LBS | DIASTOLIC BLOOD PRESSURE: 59 MMHG
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
## 2023-07-25 DIAGNOSIS — M54.6 ACUTE BILATERAL THORACIC BACK PAIN: ICD-10-CM

## 2023-07-25 DIAGNOSIS — S16.1XXA ACUTE STRAIN OF NECK MUSCLE, INITIAL ENCOUNTER: ICD-10-CM

## 2023-07-25 PROCEDURE — 99284 EMERGENCY DEPT VISIT MOD MDM: CPT

## 2023-07-25 PROCEDURE — 6370000000 HC RX 637 (ALT 250 FOR IP)

## 2023-07-25 PROCEDURE — 72040 X-RAY EXAM NECK SPINE 2-3 VW: CPT

## 2023-07-25 PROCEDURE — 96372 THER/PROPH/DIAG INJ SC/IM: CPT

## 2023-07-25 PROCEDURE — 6360000002 HC RX W HCPCS

## 2023-07-25 PROCEDURE — 72072 X-RAY EXAM THORAC SPINE 3VWS: CPT

## 2023-07-25 RX ORDER — DICLOFENAC SODIUM 75 MG/1
75 TABLET, DELAYED RELEASE ORAL 2 TIMES DAILY
Qty: 20 TABLET | Refills: 0 | Status: SHIPPED | OUTPATIENT
Start: 2023-07-25 | End: 2023-08-04

## 2023-07-25 RX ORDER — CYCLOBENZAPRINE HCL 10 MG
10 TABLET ORAL 3 TIMES DAILY PRN
Qty: 21 TABLET | Refills: 0 | Status: SHIPPED | OUTPATIENT
Start: 2023-07-25 | End: 2023-08-04

## 2023-07-25 RX ORDER — KETOROLAC TROMETHAMINE 30 MG/ML
30 INJECTION, SOLUTION INTRAMUSCULAR; INTRAVENOUS ONCE
Status: COMPLETED | OUTPATIENT
Start: 2023-07-25 | End: 2023-07-25

## 2023-07-25 RX ORDER — CYCLOBENZAPRINE HCL 10 MG
10 TABLET ORAL ONCE
Status: COMPLETED | OUTPATIENT
Start: 2023-07-25 | End: 2023-07-25

## 2023-07-25 RX ADMIN — CYCLOBENZAPRINE 10 MG: 10 TABLET, FILM COATED ORAL at 19:41

## 2023-07-25 RX ADMIN — KETOROLAC TROMETHAMINE 30 MG: 30 INJECTION, SOLUTION INTRAMUSCULAR; INTRAVENOUS at 19:41

## 2023-07-25 ASSESSMENT — ENCOUNTER SYMPTOMS
VOMITING: 0
CHEST TIGHTNESS: 0
ABDOMINAL PAIN: 0
NAUSEA: 0
GASTROINTESTINAL NEGATIVE: 1
SHORTNESS OF BREATH: 0
BACK PAIN: 1

## 2023-07-25 ASSESSMENT — PAIN SCALES - GENERAL: PAINLEVEL_OUTOF10: 7

## 2023-07-25 ASSESSMENT — VISUAL ACUITY: OU: 1

## 2023-07-25 ASSESSMENT — PAIN - FUNCTIONAL ASSESSMENT: PAIN_FUNCTIONAL_ASSESSMENT: 0-10
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
## 2025-05-05 ENCOUNTER — HOSPITAL ENCOUNTER (EMERGENCY)
Facility: HOSPITAL | Age: 30
Discharge: HOME | End: 2025-05-06
Payer: COMMERCIAL

## 2025-05-05 VITALS
BODY MASS INDEX: 36.41 KG/M2 | HEART RATE: 81 BPM | RESPIRATION RATE: 16 BRPM | TEMPERATURE: 97.9 F | DIASTOLIC BLOOD PRESSURE: 74 MMHG | OXYGEN SATURATION: 95 % | HEIGHT: 73 IN | SYSTOLIC BLOOD PRESSURE: 108 MMHG | WEIGHT: 274.69 LBS
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
## 2025-05-05 DIAGNOSIS — S30.860A TICK BITE OF LOWER BACK, INITIAL ENCOUNTER: Primary | ICD-10-CM

## 2025-05-05 DIAGNOSIS — R21 RASH AND OTHER NONSPECIFIC SKIN ERUPTION: ICD-10-CM

## 2025-05-05 PROCEDURE — 99283 EMERGENCY DEPT VISIT LOW MDM: CPT

## 2025-05-05 RX ORDER — DOXYCYCLINE HYCLATE 100 MG
100 TABLET ORAL 2 TIMES DAILY
Qty: 20 TABLET | Refills: 0 | Status: SHIPPED | OUTPATIENT
Start: 2025-05-05 | End: 2025-05-15

## 2025-05-05 ASSESSMENT — PAIN - FUNCTIONAL ASSESSMENT
PAIN_FUNCTIONAL_ASSESSMENT: NONE - DENIES PAIN
PAIN_FUNCTIONAL_ASSESSMENT: ACTIVITIES ARE NOT PREVENTED

## 2025-05-05 ASSESSMENT — PAIN DESCRIPTION - DESCRIPTORS: DESCRIPTORS: ITCHING;BURNING

## 2025-05-05 ASSESSMENT — PAIN DESCRIPTION - ORIENTATION: ORIENTATION: RIGHT

## 2025-05-05 ASSESSMENT — LIFESTYLE VARIABLES
HOW OFTEN DO YOU HAVE A DRINK CONTAINING ALCOHOL: NEVER
HOW MANY STANDARD DRINKS CONTAINING ALCOHOL DO YOU HAVE ON A TYPICAL DAY: PATIENT DOES NOT DRINK

## 2025-05-05 ASSESSMENT — PAIN DESCRIPTION - PAIN TYPE: TYPE: ACUTE PAIN

## 2025-05-05 ASSESSMENT — PAIN DESCRIPTION - LOCATION: LOCATION: BACK

## 2025-05-05 ASSESSMENT — PAIN SCALES - GENERAL: PAINLEVEL_OUTOF10: 3

## 2025-05-06 NOTE — ED PROVIDER NOTES
[facility] EMERGENCY DEPARTMENT  EMERGENCY DEPARTMENT ENCOUNTER       Pt Name: Yovani Hanson  MRN: [id number]  Birthdate 1995  Date of evaluation: 5/5/2025  Provider: Holly Lloyd PA-C   PCP: No primary care provider on file.  Note Started: 11:46 PM EDT 5/5/25     CHIEF COMPLAINT       Chief Complaint   Patient presents with    Insect Bite     Ambulatory into triage with cc of being bit by a lone star tick on his back. Pt is concerned about developing lymes disease. Bite is itchy and inflammed.         HISTORY OF PRESENT ILLNESS: 1 or more elements      History From: Patient  HPI Limitations: None     Yovani Hanson is a 29 y.o. male who presents amatory to the emergency department after being bitten by a tick.  Patient states that he removed the tick, did some research and found that it was a \"Lone Star tick\".  Patient states that he has noticed some red spots on his back around where the tick had bitten him, states area has been itchy and inflamed.  Patient expresses concern about developing Lyme's disease and would like prophylactic treatment.     Nursing Notes were all reviewed and agreed with or any disagreements were addressed in the HPI.     REVIEW OF SYSTEMS      Review of Systems     Positives and Pertinent negatives as per HPI.    PAST HISTORY     Past Medical History:  Past Medical History:   Diagnosis Date    Migraine        Past Surgical History:  No past surgical history on file.    Family History:  Family History   Problem Relation Age of Onset    Migraines Mother     Migraines Maternal Aunt     Migraines Maternal Grandmother        Social History:  Social History     Tobacco Use    Smoking status: Never    Smokeless tobacco: Never   Substance Use Topics    Alcohol use: No    Drug use: Yes     Types: Marijuana (Weed)       Allergies:  Allergies   Allergen Reactions    Tramadol Itching       CURRENT MEDICATIONS      Previous Medications    DICLOFENAC (VOLTAREN) 75 MG EC TABLET